# Patient Record
Sex: FEMALE | Race: WHITE | HISPANIC OR LATINO | Employment: PART TIME | URBAN - METROPOLITAN AREA
[De-identification: names, ages, dates, MRNs, and addresses within clinical notes are randomized per-mention and may not be internally consistent; named-entity substitution may affect disease eponyms.]

---

## 2017-06-10 ENCOUNTER — APPOINTMENT (OUTPATIENT)
Dept: RADIOLOGY | Facility: IMAGING CENTER | Age: 32
End: 2017-06-10
Attending: NURSE PRACTITIONER
Payer: COMMERCIAL

## 2017-06-10 ENCOUNTER — OFFICE VISIT (OUTPATIENT)
Dept: URGENT CARE | Facility: CLINIC | Age: 32
End: 2017-06-10
Payer: COMMERCIAL

## 2017-06-10 VITALS
OXYGEN SATURATION: 98 % | TEMPERATURE: 97.2 F | SYSTOLIC BLOOD PRESSURE: 102 MMHG | RESPIRATION RATE: 16 BRPM | HEART RATE: 78 BPM | DIASTOLIC BLOOD PRESSURE: 78 MMHG

## 2017-06-10 DIAGNOSIS — S29.019A STRAIN OF THORACIC SPINE, INITIAL ENCOUNTER: ICD-10-CM

## 2017-06-10 DIAGNOSIS — S20.212A CHEST WALL CONTUSION, LEFT, INITIAL ENCOUNTER: ICD-10-CM

## 2017-06-10 DIAGNOSIS — S46.912A LEFT SHOULDER STRAIN, INITIAL ENCOUNTER: ICD-10-CM

## 2017-06-10 DIAGNOSIS — S16.1XXA STRAIN OF NECK, INITIAL ENCOUNTER: ICD-10-CM

## 2017-06-10 PROCEDURE — 99203 OFFICE O/P NEW LOW 30 MIN: CPT | Performed by: NURSE PRACTITIONER

## 2017-06-10 PROCEDURE — 73030 X-RAY EXAM OF SHOULDER: CPT | Mod: TC | Performed by: NURSE PRACTITIONER

## 2017-06-10 PROCEDURE — 71020 DX-CHEST-2 VIEWS: CPT | Mod: 26 | Performed by: NURSE PRACTITIONER

## 2017-06-10 PROCEDURE — 72040 X-RAY EXAM NECK SPINE 2-3 VW: CPT | Mod: TC | Performed by: NURSE PRACTITIONER

## 2017-06-10 PROCEDURE — 72070 X-RAY EXAM THORAC SPINE 2VWS: CPT | Mod: TC | Performed by: NURSE PRACTITIONER

## 2017-06-10 RX ORDER — CYCLOBENZAPRINE HCL 10 MG
10 TABLET ORAL 3 TIMES DAILY PRN
Qty: 30 TAB | Refills: 0 | Status: ON HOLD | OUTPATIENT
Start: 2017-06-10 | End: 2018-03-14

## 2017-06-10 ASSESSMENT — ENCOUNTER SYMPTOMS
CHILLS: 0
NECK PAIN: 1
LOSS OF CONSCIOUSNESS: 0
NAUSEA: 0
FOCAL WEAKNESS: 0
ABDOMINAL PAIN: 0
VOMITING: 0
BACK PAIN: 1
FEVER: 0
MYALGIAS: 0
SENSORY CHANGE: 0
TINGLING: 0

## 2017-06-10 NOTE — Clinical Note
Alicia 10, 2017         Patient: Mattie Garcia   YOB: 1985   Date of Visit: 6/10/2017           To Whom it May Concern:    Mattie Garcia was seen in my clinic on 6/10/2017. She should be off for 2-3 days due to illness.     If you have any questions or concerns, please don't hesitate to call.        Sincerely,           SARA HendricksP.JAIR.  Electronically Signed
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
## 2018-03-12 ENCOUNTER — HOSPITAL ENCOUNTER (OUTPATIENT)
Facility: MEDICAL CENTER | Age: 33
End: 2018-03-12
Attending: OBSTETRICS & GYNECOLOGY | Admitting: OBSTETRICS & GYNECOLOGY
Payer: COMMERCIAL

## 2018-03-12 VITALS
TEMPERATURE: 97 F | HEART RATE: 93 BPM | SYSTOLIC BLOOD PRESSURE: 107 MMHG | RESPIRATION RATE: 16 BRPM | HEIGHT: 61 IN | BODY MASS INDEX: 36.63 KG/M2 | DIASTOLIC BLOOD PRESSURE: 64 MMHG | WEIGHT: 194 LBS

## 2018-03-12 PROCEDURE — 59025 FETAL NON-STRESS TEST: CPT | Performed by: OBSTETRICS & GYNECOLOGY

## 2018-03-13 ENCOUNTER — HOSPITAL ENCOUNTER (INPATIENT)
Facility: MEDICAL CENTER | Age: 33
LOS: 1 days | End: 2018-03-14
Attending: OBSTETRICS & GYNECOLOGY | Admitting: OBSTETRICS & GYNECOLOGY
Payer: COMMERCIAL

## 2018-03-13 LAB
BASOPHILS # BLD AUTO: 0.3 % (ref 0–1.8)
BASOPHILS # BLD: 0.03 K/UL (ref 0–0.12)
CRYSTALS AMN MICRO: NORMAL
EOSINOPHIL # BLD AUTO: 0.04 K/UL (ref 0–0.51)
EOSINOPHIL NFR BLD: 0.4 % (ref 0–6.9)
ERYTHROCYTE [DISTWIDTH] IN BLOOD BY AUTOMATED COUNT: 39.5 FL (ref 35.9–50)
HCT VFR BLD AUTO: 42.3 % (ref 37–47)
HGB BLD-MCNC: 13.8 G/DL (ref 12–16)
HOLDING TUBE BB 8507: NORMAL
IMM GRANULOCYTES # BLD AUTO: 0.02 K/UL (ref 0–0.11)
IMM GRANULOCYTES NFR BLD AUTO: 0.2 % (ref 0–0.9)
LYMPHOCYTES # BLD AUTO: 2.81 K/UL (ref 1–4.8)
LYMPHOCYTES NFR BLD: 25.5 % (ref 22–41)
MCH RBC QN AUTO: 27.6 PG (ref 27–33)
MCHC RBC AUTO-ENTMCNC: 32.6 G/DL (ref 33.6–35)
MCV RBC AUTO: 84.6 FL (ref 81.4–97.8)
MONOCYTES # BLD AUTO: 0.64 K/UL (ref 0–0.85)
MONOCYTES NFR BLD AUTO: 5.8 % (ref 0–13.4)
NEUTROPHILS # BLD AUTO: 7.46 K/UL (ref 2–7.15)
NEUTROPHILS NFR BLD: 67.8 % (ref 44–72)
NRBC # BLD AUTO: 0 K/UL
NRBC BLD-RTO: 0 /100 WBC
PLATELET # BLD AUTO: 280 K/UL (ref 164–446)
PMV BLD AUTO: 10 FL (ref 9–12.9)
RBC # BLD AUTO: 5 M/UL (ref 4.2–5.4)
WBC # BLD AUTO: 11 K/UL (ref 4.8–10.8)

## 2018-03-13 PROCEDURE — 36415 COLL VENOUS BLD VENIPUNCTURE: CPT

## 2018-03-13 PROCEDURE — 700102 HCHG RX REV CODE 250 W/ 637 OVERRIDE(OP): Performed by: OBSTETRICS & GYNECOLOGY

## 2018-03-13 PROCEDURE — 4A1HXCZ MONITORING OF PRODUCTS OF CONCEPTION, CARDIAC RATE, EXTERNAL APPROACH: ICD-10-PCS | Performed by: OBSTETRICS & GYNECOLOGY

## 2018-03-13 PROCEDURE — 700111 HCHG RX REV CODE 636 W/ 250 OVERRIDE (IP): Performed by: OBSTETRICS & GYNECOLOGY

## 2018-03-13 PROCEDURE — 770002 HCHG ROOM/CARE - OB PRIVATE (112)

## 2018-03-13 PROCEDURE — 304965 HCHG RECOVERY SERVICES

## 2018-03-13 PROCEDURE — 700105 HCHG RX REV CODE 258

## 2018-03-13 PROCEDURE — 85025 COMPLETE CBC W/AUTO DIFF WBC: CPT

## 2018-03-13 PROCEDURE — A9270 NON-COVERED ITEM OR SERVICE: HCPCS | Performed by: OBSTETRICS & GYNECOLOGY

## 2018-03-13 PROCEDURE — 89060 EXAM SYNOVIAL FLUID CRYSTALS: CPT

## 2018-03-13 PROCEDURE — 700111 HCHG RX REV CODE 636 W/ 250 OVERRIDE (IP)

## 2018-03-13 PROCEDURE — 59409 OBSTETRICAL CARE: CPT

## 2018-03-13 RX ORDER — MAG HYDROX/ALUMINUM HYD/SIMETH 400-400-40
1 SUSPENSION, ORAL (FINAL DOSE FORM) ORAL
Status: DISCONTINUED | OUTPATIENT
Start: 2018-03-13 | End: 2018-03-14 | Stop reason: HOSPADM

## 2018-03-13 RX ORDER — SODIUM CHLORIDE, SODIUM LACTATE, POTASSIUM CHLORIDE, CALCIUM CHLORIDE 600; 310; 30; 20 MG/100ML; MG/100ML; MG/100ML; MG/100ML
INJECTION, SOLUTION INTRAVENOUS PRN
Status: DISCONTINUED | OUTPATIENT
Start: 2018-03-13 | End: 2018-03-14 | Stop reason: HOSPADM

## 2018-03-13 RX ORDER — IBUPROFEN 600 MG/1
600 TABLET ORAL EVERY 6 HOURS PRN
Status: DISCONTINUED | OUTPATIENT
Start: 2018-03-13 | End: 2018-03-14 | Stop reason: HOSPADM

## 2018-03-13 RX ORDER — MISOPROSTOL 200 UG/1
800 TABLET ORAL
Status: DISCONTINUED | OUTPATIENT
Start: 2018-03-13 | End: 2018-03-13 | Stop reason: HOSPADM

## 2018-03-13 RX ORDER — CARBOPROST TROMETHAMINE 250 UG/ML
250 INJECTION, SOLUTION INTRAMUSCULAR
Status: DISCONTINUED | OUTPATIENT
Start: 2018-03-13 | End: 2018-03-13 | Stop reason: HOSPADM

## 2018-03-13 RX ORDER — METHYLERGONOVINE MALEATE 0.2 MG/ML
0.2 INJECTION INTRAVENOUS
Status: DISCONTINUED | OUTPATIENT
Start: 2018-03-13 | End: 2018-03-13 | Stop reason: HOSPADM

## 2018-03-13 RX ORDER — OXYTOCIN 10 [USP'U]/ML
10 INJECTION, SOLUTION INTRAMUSCULAR; INTRAVENOUS
Status: DISCONTINUED | OUTPATIENT
Start: 2018-03-13 | End: 2018-03-13 | Stop reason: HOSPADM

## 2018-03-13 RX ORDER — SODIUM CHLORIDE, SODIUM LACTATE, POTASSIUM CHLORIDE, CALCIUM CHLORIDE 600; 310; 30; 20 MG/100ML; MG/100ML; MG/100ML; MG/100ML
INJECTION, SOLUTION INTRAVENOUS CONTINUOUS
Status: DISCONTINUED | OUTPATIENT
Start: 2018-03-13 | End: 2018-03-13 | Stop reason: HOSPADM

## 2018-03-13 RX ORDER — OXYCODONE AND ACETAMINOPHEN 10; 325 MG/1; MG/1
1 TABLET ORAL EVERY 4 HOURS PRN
Status: DISCONTINUED | OUTPATIENT
Start: 2018-03-13 | End: 2018-03-14 | Stop reason: HOSPADM

## 2018-03-13 RX ORDER — ACETAMINOPHEN 325 MG/1
325 TABLET ORAL EVERY 4 HOURS PRN
Status: DISCONTINUED | OUTPATIENT
Start: 2018-03-13 | End: 2018-03-14 | Stop reason: HOSPADM

## 2018-03-13 RX ORDER — VITAMIN A ACETATE, BETA CAROTENE, ASCORBIC ACID, CHOLECALCIFEROL, .ALPHA.-TOCOPHEROL ACETATE, DL-, THIAMINE MONONITRATE, RIBOFLAVIN, NIACINAMIDE, PYRIDOXINE HYDROCHLORIDE, FOLIC ACID, CYANOCOBALAMIN, CALCIUM CARBONATE, FERROUS FUMARATE, ZINC OXIDE, CUPRIC OXIDE 3080; 12; 120; 400; 1; 1.84; 3; 20; 22; 920; 25; 200; 27; 10; 2 [IU]/1; UG/1; MG/1; [IU]/1; MG/1; MG/1; MG/1; MG/1; MG/1; [IU]/1; MG/1; MG/1; MG/1; MG/1; MG/1
1 TABLET, FILM COATED ORAL EVERY MORNING
Status: DISCONTINUED | OUTPATIENT
Start: 2018-03-13 | End: 2018-03-14 | Stop reason: HOSPADM

## 2018-03-13 RX ORDER — ALUMINA, MAGNESIA, AND SIMETHICONE 2400; 2400; 240 MG/30ML; MG/30ML; MG/30ML
30 SUSPENSION ORAL EVERY 6 HOURS PRN
Status: DISCONTINUED | OUTPATIENT
Start: 2018-03-13 | End: 2018-03-13 | Stop reason: HOSPADM

## 2018-03-13 RX ORDER — DEXTROSE, SODIUM CHLORIDE, SODIUM LACTATE, POTASSIUM CHLORIDE, AND CALCIUM CHLORIDE 5; .6; .31; .03; .02 G/100ML; G/100ML; G/100ML; G/100ML; G/100ML
INJECTION, SOLUTION INTRAVENOUS CONTINUOUS
Status: DISCONTINUED | OUTPATIENT
Start: 2018-03-13 | End: 2018-03-13 | Stop reason: HOSPADM

## 2018-03-13 RX ORDER — MISOPROSTOL 200 UG/1
600 TABLET ORAL
Status: DISCONTINUED | OUTPATIENT
Start: 2018-03-13 | End: 2018-03-14 | Stop reason: HOSPADM

## 2018-03-13 RX ORDER — ONDANSETRON 2 MG/ML
4 INJECTION INTRAMUSCULAR; INTRAVENOUS EVERY 6 HOURS PRN
Status: DISCONTINUED | OUTPATIENT
Start: 2018-03-13 | End: 2018-03-14 | Stop reason: HOSPADM

## 2018-03-13 RX ORDER — OXYCODONE HYDROCHLORIDE AND ACETAMINOPHEN 5; 325 MG/1; MG/1
1 TABLET ORAL EVERY 4 HOURS PRN
Status: DISCONTINUED | OUTPATIENT
Start: 2018-03-13 | End: 2018-03-14 | Stop reason: HOSPADM

## 2018-03-13 RX ORDER — METHYLERGONOVINE MALEATE 0.2 MG/ML
0.2 INJECTION INTRAVENOUS
Status: DISCONTINUED | OUTPATIENT
Start: 2018-03-13 | End: 2018-03-14 | Stop reason: HOSPADM

## 2018-03-13 RX ORDER — ONDANSETRON 4 MG/1
4 TABLET, ORALLY DISINTEGRATING ORAL EVERY 6 HOURS PRN
Status: DISCONTINUED | OUTPATIENT
Start: 2018-03-13 | End: 2018-03-14 | Stop reason: HOSPADM

## 2018-03-13 RX ORDER — HYDROXYZINE 50 MG/1
50 TABLET, FILM COATED ORAL EVERY 6 HOURS PRN
Status: DISCONTINUED | OUTPATIENT
Start: 2018-03-13 | End: 2018-03-13 | Stop reason: HOSPADM

## 2018-03-13 RX ORDER — DOCUSATE SODIUM 100 MG/1
100 CAPSULE, LIQUID FILLED ORAL 2 TIMES DAILY PRN
Status: DISCONTINUED | OUTPATIENT
Start: 2018-03-13 | End: 2018-03-14 | Stop reason: HOSPADM

## 2018-03-13 RX ORDER — CARBOPROST TROMETHAMINE 250 UG/ML
250 INJECTION, SOLUTION INTRAMUSCULAR
Status: DISCONTINUED | OUTPATIENT
Start: 2018-03-13 | End: 2018-03-14 | Stop reason: HOSPADM

## 2018-03-13 RX ORDER — BISACODYL 10 MG
10 SUPPOSITORY, RECTAL RECTAL PRN
Status: DISCONTINUED | OUTPATIENT
Start: 2018-03-13 | End: 2018-03-14 | Stop reason: HOSPADM

## 2018-03-13 RX ORDER — SODIUM CHLORIDE, SODIUM LACTATE, POTASSIUM CHLORIDE, CALCIUM CHLORIDE 600; 310; 30; 20 MG/100ML; MG/100ML; MG/100ML; MG/100ML
INJECTION, SOLUTION INTRAVENOUS
Status: COMPLETED
Start: 2018-03-13 | End: 2018-03-13

## 2018-03-13 RX ADMIN — FENTANYL CITRATE 100 MCG: 50 INJECTION, SOLUTION INTRAMUSCULAR; INTRAVENOUS at 07:05

## 2018-03-13 RX ADMIN — OXYCODONE HYDROCHLORIDE AND ACETAMINOPHEN 1 TABLET: 10; 325 TABLET ORAL at 11:33

## 2018-03-13 RX ADMIN — SODIUM CHLORIDE, POTASSIUM CHLORIDE, SODIUM LACTATE AND CALCIUM CHLORIDE 1000 ML: 600; 310; 30; 20 INJECTION, SOLUTION INTRAVENOUS at 06:34

## 2018-03-13 RX ADMIN — Medication 125 ML/HR: at 08:10

## 2018-03-13 RX ADMIN — Medication 2000 ML/HR: at 07:28

## 2018-03-13 RX ADMIN — IBUPROFEN 600 MG: 600 TABLET, FILM COATED ORAL at 08:10

## 2018-03-13 RX ADMIN — SODIUM CHLORIDE, SODIUM LACTATE, POTASSIUM CHLORIDE, CALCIUM CHLORIDE 1000 ML: 600; 310; 30; 20 INJECTION, SOLUTION INTRAVENOUS at 06:34

## 2018-03-13 ASSESSMENT — LIFESTYLE VARIABLES
EVER_SMOKED: NEVER
ALCOHOL_USE: NO

## 2018-03-13 ASSESSMENT — PAIN SCALES - GENERAL: PAINLEVEL_OUTOF10: 7

## 2018-03-13 NOTE — CONSULTS
DATE OF SERVICE:  2018    HISTORY OF PRESENT ILLNESS:  This is a 32-year-old  4, para 3 at 39-40   weeks who came into labor and delivery for evaluation secondary to some   vaginal spotting.  She was seen in the office today and had her membranes   stripped in the office, her exam was 3-4 cm.  She noticed some spotting in the   morning, which stopped in the afternoon and then resumed again this   afternoon.  She is not currently actively bleeding.  She reports occasional   mild contractions.  She denies loss of fluid and reports that the baby has   been moving well.    Prenatal care has been with Dr. Menendez, her JUANITA is 03/15/2018.  She is GBS   negative per patient report.    PAST MEDICAL HISTORY:  None.    PAST SURGICAL HISTORY:  Eye surgery.    MEDICATIONS:  Prenatal vitamins.    ALLERGIES:  NKDA.    GYNECOLOGIC HISTORY:  No history of sexually transmitted diseases or HSV.    OBSTETRICAL HISTORY:  Patient is a  4, para 3.  She has had 3   uncomplicated and unmedicated vaginal deliveries, largest baby weighed 8   pounds.    SOCIAL HISTORY:  Patient is .  She denies tobacco, alcohol or drugs.    She has good transportation to and from the hospital.    PHYSICAL EXAMINATION:  VITAL SIGNS:  Afebrile.  Vital signs are stable.  GENERAL:  She is a well-developed, well-nourished female in no acute distress.  PELVIC:  Sterile vaginal exam per nursing shows that she is 3, 50 and minus 2   to 3 with a scant vaginal spotting noted.  Exam is unchanged over the course   of an hour.  Fetal heart tones show baseline 140s with moderate long-term   variability, accels present, no variables, no decels.  Tocometer shows   irregular contractions up to every 5 minutes apart.    ASSESSMENT AND PLAN:  This is a 32-year-old  4, para 3 at 39 weeks and   4 days and possible latent labor.  1.  Patient's exam shows no significant bleeding and is consistent with prior   membrane sweep today.  Her cervical exam  is basically unchanged from earlier   today and has not changed during the course of her evaluation here in labor   and delivery.  She was given the option to walk and be rechecked to return   home for expectant management as well as to undergo amniotomy and possible   induction with Pitocin this evening.  She has opted to return home.  She   states that she does have adequate transportation to get back if needed.  2.  Fetal surveillance is reassuring for gestational age.       ____________________________________     MD ULISES BOTELLO / BRI    DD:  03/12/2018 20:28:52  DT:  03/12/2018 22:49:19    D#:  [id number]  Job#:  [id number]

## 2018-03-13 NOTE — PROGRESS NOTES
"Pt is a; JUANITA 3/15; 39w4d. Pt c/o VB more than a pad in one hour. Pt reported +FM, -LOF, and occasional cramping. Pt denies needed to void. EFM and TOCO applied. Reactive NST obtained. Pt stating \"I don't think this is labor like my other babies. I was just worried about the bleeding\". SVE at 3/50/-3. Small amount of light pink VB noted upon exam.      Working notified and asked to review tracing.     Dr Working at bedside. POC discussed. Pt desired to go home once cervix is rechecked.     SVE with no cervical change noted. Orders received to discharge pt with labor precautions.     General discharge instructions and labor precautions given. Pt signed paperwork and ambulated out.   "

## 2018-03-13 NOTE — PROGRESS NOTES
0516- [facility] 3/15/2018 36.4 weeks presents to L&D with c/o SROM at 0340 this morning. TOCO/FM applied. SSE=no gross pooling. Fern collected and sent to lab. SVE=/-2 pt is eladio every 3 minutes.  0630-Lab called and Fern is positive. Dr. Bruno called and updated on pt. Orders will be put in for admit. IV started, labs drawn and admit profile completed.  0640- Working at bedside to see pt. SVE=4-  0700-report given to dayshift RN

## 2018-03-13 NOTE — PROGRESS NOTES
Pt arrived via wheel chair with belongings to room S352.  [facility]  Pt oriented to room, call light & infant security.  Assessment done.  Fundus firm, lochia light.  Vital signs stable.  IV patent on pump.  Pt denies complaints of pain, see MAR.  Pt aware of dangers related to sleeping with infant, reviewed plan of care with pt & encouraged to call with needs or prior to ambulating.  Call light in place.

## 2018-03-13 NOTE — PROGRESS NOTES
"0700 - Report from AFSHAN Nair RN. Pt feeling frequent UCs, grimacing with them. POC discussed, questions answered. FOB \"Manuel\" is on the way.   0705 - Pt feeling pressure, requesting exam. /-2  0710 - Dr. Meenndez at bedside.   0720 - FOB at bedside. SVE 8100/-1  0722 - SVE C/0. Dr. Menendez called  0724 -  of viable male infant. 9/9 APGARs  0728 - Delivery of placenta  09 - Pt up to bathroom with steady gait, + void  09 - Pt transferred to [facility] via wheelchair in stable condition with infant in arms, belongings at bedside. Report to ANALIA Babcock  "

## 2018-03-13 NOTE — PROGRESS NOTES
Pt assisted to restroom with this RN, pat provided, ambulated to bed without difficulty.  Call light in place, encouraged to call with needs

## 2018-03-13 NOTE — DELIVERY NOTE
"Delivery Summary    Mattie Alatorre is a 31 yo  who presented at 39wk4d with SROM, in active labor. She progressed to complete dilation and with one pushing effort she underwent a spontaneous vaginal delivery of a viable male infant \"Jesus\" in ANTHONY position with APGARS 9/9. Infant was placed on maternal chest. Cord was clamped and cut after 30 second delay. The placenta was delivered with gentle traction. The uterus firmed with fundal pressure and pitocin. The perineum was examined and no lacerations were detected.     EBL: 300cc  Anesthesia: epidural  Complications: None    Stacey Menendez M.D.      "

## 2018-03-14 VITALS
RESPIRATION RATE: 18 BRPM | HEIGHT: 61 IN | DIASTOLIC BLOOD PRESSURE: 73 MMHG | HEART RATE: 69 BPM | BODY MASS INDEX: 36.63 KG/M2 | TEMPERATURE: 97.2 F | SYSTOLIC BLOOD PRESSURE: 103 MMHG | WEIGHT: 194 LBS | OXYGEN SATURATION: 97 %

## 2018-03-14 LAB
ERYTHROCYTE [DISTWIDTH] IN BLOOD BY AUTOMATED COUNT: 40.1 FL (ref 35.9–50)
HCT VFR BLD AUTO: 35.2 % (ref 37–47)
HGB BLD-MCNC: 11.7 G/DL (ref 12–16)
MCH RBC QN AUTO: 28.5 PG (ref 27–33)
MCHC RBC AUTO-ENTMCNC: 33.2 G/DL (ref 33.6–35)
MCV RBC AUTO: 85.9 FL (ref 81.4–97.8)
PLATELET # BLD AUTO: 229 K/UL (ref 164–446)
PMV BLD AUTO: 10.2 FL (ref 9–12.9)
RBC # BLD AUTO: 4.1 M/UL (ref 4.2–5.4)
WBC # BLD AUTO: 9.2 K/UL (ref 4.8–10.8)

## 2018-03-14 PROCEDURE — A9270 NON-COVERED ITEM OR SERVICE: HCPCS | Performed by: OBSTETRICS & GYNECOLOGY

## 2018-03-14 PROCEDURE — 700112 HCHG RX REV CODE 229: Performed by: OBSTETRICS & GYNECOLOGY

## 2018-03-14 PROCEDURE — 36415 COLL VENOUS BLD VENIPUNCTURE: CPT

## 2018-03-14 PROCEDURE — 700102 HCHG RX REV CODE 250 W/ 637 OVERRIDE(OP): Performed by: OBSTETRICS & GYNECOLOGY

## 2018-03-14 PROCEDURE — 85027 COMPLETE CBC AUTOMATED: CPT

## 2018-03-14 RX ORDER — IBUPROFEN 600 MG/1
600 TABLET ORAL EVERY 6 HOURS PRN
Qty: 30 TAB | Refills: 0 | Status: ON HOLD | OUTPATIENT
Start: 2018-03-14 | End: 2020-01-19

## 2018-03-14 RX ADMIN — DOCUSATE SODIUM 100 MG: 100 CAPSULE ORAL at 02:17

## 2018-03-14 RX ADMIN — IBUPROFEN 600 MG: 600 TABLET, FILM COATED ORAL at 02:17

## 2018-03-14 RX ADMIN — IBUPROFEN 600 MG: 600 TABLET, FILM COATED ORAL at 09:33

## 2018-03-14 RX ADMIN — Medication 1 TABLET: at 09:33

## 2018-03-14 ASSESSMENT — PAIN SCALES - GENERAL
PAINLEVEL_OUTOF10: 0
PAINLEVEL_OUTOF10: 0
PAINLEVEL_OUTOF10: 4
PAINLEVEL_OUTOF10: 5
PAINLEVEL_OUTOF10: 2

## 2018-03-14 NOTE — DISCHARGE INSTRUCTIONS
POSTPARTUM DISCHARGE INSTRUCTIONS FOR MOM    YOB: 1985   Age: 32 y.o.               Admit Date: 3/13/2018     Discharge Date: 3/14/2018  Attending Doctor:  Stacey Menendez M.D.                  Allergies:  Patient has no known allergies.      Discharge Plan:   Diet Plan: Discussed  Activity Level: Discussed  Confirmed Follow up Appointment: Patient to Call and Schedule Appointment  Medication Reconciliation Updated: Yes  Influenza Vaccine Indication: Not indicated: Previously immunized this influenza season and > 8 years of age    REASONS TO CALL YOUR OBSTETRICIAN:  1.   Persistent fever or shaking chills (Temperature higher than 100.4)  2.   Heavy bleeding (soaking more than 1 pad per hour); Passing clots  3.   Foul odor from vagina  4.   Mastitis (Breast infection; breast pain, chills, fever, redness)  5.   Urinary pain, burning or frequency  6.   Episiotomy infection  7.   Abdominal incision infection  8.   Severe depression longer than 24 hours    HAND WASHING  · Prior to handling the baby.  · Before breastfeeding or bottle feeding baby.  · After using the bathroom or changing the baby's diaper.    WOUND CARE  Ask your physician for additional care instructions.  In general:    ·  Incision:      · Keep clean and dry.    · Do NOT lift anything heavier than your baby for up to 6 weeks.    · There should not be any opening or pus.      VAGINAL CARE  · Nothing inside vagina for 6 weeks: no sexual intercourse, tampons or douching.  · Bleeding may continue for 2-4 weeks.  Amount may vary.    · Call your physician for heavy bleeding which means soaking more than 1 pad per hour    BIRTH CONTROL  · It is possible to become pregnant at any time after delivery and while breastfeeding.  · Plan to discuss a method of birth control with your physician at your follow up visit. visit.    DIET AND ELIMINATION  · Eating more fiber (bran cereal, fruits, and vegetables) and drinking plenty of fluids will help  "to avoid constipation.  · Urinary frequency after childbirth is normal.    POSTPARTUM BLUES  During the first few days after birth, you may experience a sense of the \"blues\" which may include impatience, irritability or even crying.  These feeling come and go quickly.  However, as many as 1 in 10 women experience emotional symptoms known as postpartum depression.    Postpartum depression:  May start as early as the second or third day after delivery or take several weeks or months to develop.  Symptoms of \"blues\" are present, but are more intense:  Crying spells; loss of appetite; feelings of hopelessness or loss of control; fear of touching the baby; over concern or no concern at all about the baby; little or no concern about your own appearance/caring for yourself; and/or inability to sleep or excessive sleeping.  Contact your physician if you are experiencing any of these symptoms.    Crisis Hotline:  · [place] Crisis Hotline:  4-360-LHCOVGA  Or 1-686.965.4054  · [place] Crisis Hotline:  1-360.478.3798  Or 603-122-0151    PREVENTING SHAKEN BABY:  If you are angry or stressed, PUT THE BABY IN THE CRIB, step away, take some deep breaths, and wait until you are calm to care for the baby.  DO NOT SHAKE THE BABY.  You are not alone, call a supporter for help.    · Crisis Call Center 24/7 crisis line 999-184-1441 or 1-437.912.8649  · You can also text them, text \"ANSWER\" to 090470    QUIT SMOKING/TOBACCO USE:  I understand the use of any tobacco products increases my chance of suffering from future heart disease and could cause other illnesses which may shorten my life. Quitting the use of tobacco products is the single most important thing I can do to improve my health. For further information on smoking / tobacco cessation call a Toll Free Quit Line at 1-595.835.2076 (*[employer]) or 1-966.493.2724 (American Lung Association) or you can access the web based program at www.lungusa.org.    · [place] " Tobacco Users Help Line:  (503) 828-8020       Toll Free: 1-716.102.4049  · Quit Tobacco Program [facility] (982)942-5331    DEPRESSION / SUICIDE RISK:  As [facility] are [facility], it is important to learn how to keep safe from harming yourself.    Recognize the warning signs:  · Abrupt changes in personality, positive or negative- including increase in energy   · Giving away possessions  · Change in eating patterns- significant weight changes-  positive or negative  · Change in sleeping patterns- unable to sleep or sleeping all the time   · Unwillingness or inability to communicate  · Depression  · Unusual sadness, discouragement and loneliness  · Talk of wanting to die  · Neglect of personal appearance   · Rebelliousness- reckless behavior  · Withdrawal from people/activities they love  · Confusion- inability to concentrate     If you or a loved one observes any of these behaviors or has concerns about self-harm, here's what you can do:  · Talk about it- your feelings and reasons for harming yourself  · Remove any means that you might use to hurt yourself (examples: pills, rope, extension cords, firearm)  · Get professional help from the community (Mental Health, Substance Abuse, psychological counseling)  · Do not be alone:Call your Safe Contact- someone whom you trust who will be there for you.  · Call your local CRISIS HOTLINE 624-0996 or 535-189-4873  · Call your local Children's Mobile Crisis Response Team [place] (149) 135-3895 or www.[employer]  · Call the toll free National Suicide Prevention Hotlines   · National Suicide Prevention Lifeline 590-548-AVNQ (4425)  · National Hope Line Network 800-SUICIDE (365-3637)    DISCHARGE SURVEY:  Thank you for choosing [facility].  We hope we provided you with very good care.  You may be receiving a survey in the mail.  Please fill it out.  Your opinion is valuable to us.    ADDITIONAL EDUCATIONAL  MATERIALS GIVEN TO PATIENT:        My signature on this form indicates that:  1.  I have reviewed and understand the above information  2.  My questions regarding this information have been answered to my satisfaction.  3.  I have formulated a plan with my discharge nurse to obtain my prescribed medication for home.

## 2018-03-14 NOTE — PROGRESS NOTES
Discharge appts, education and information discussed with patient and she understands infants and her own care.  Given Rx.    Transponder removed and ID bands verified.

## 2018-03-14 NOTE — CARE PLAN
Problem: Communication  Goal: The ability to communicate needs accurately and effectively will improve  Outcome: PROGRESSING AS EXPECTED  Patient ambulating well, taking adequate fluids, voiding. Education done, all questions and concerns answered.     Problem: Pain Management  Goal: Pain level will decrease to patient's comfort goal  Outcome: PROGRESSING AS EXPECTED  Patient would like to call for PRN pain Medication when needed.

## 2018-03-14 NOTE — DISCHARGE SUMMARY
"Discharge Summary    Admission Date: 3/13/18    Discharge Date: 3/14/18    Diagnosis:    Active Problems:     (spontaneous vaginal delivery)    Labor and delivery, indication for care    Subjective: Pain controlled. Scant lochia. Breast feeding. Eating and voiding without difficulty.    BP (!) 93/57   Pulse 84   Temp 36.4 °C (97.6 °F)   Resp 16   Ht 1.549 m (5' 1\")   Wt 88 kg (194 lb)   SpO2 93%   BMI 36.66 kg/m²     GEN: NAD  GI:soft, Nt, ND  :fundus firm and below umbilicus  EXT:no edema    Hospital Course: Mattie Alatorre is a 33 yo  who presented at 39w4d with SROM and active labor. She progressed to complete and underwent  of a viable male infant \"Jesus\" with APGARS 9/9. EBL 300cc. No lacerations. She was meeting all post partum goals and stable for discharge home on PPD #1.     Discharge Instructions   1. Diet general  2. Activity pelvic rest for 6 weeks     Mattie Alatorre   Home Medication Instructions MARU:[id number]    Printed on:18 0642   Medication Information                      ibuprofen (MOTRIN) 600 MG Tab  Take 1 Tab by mouth every 6 hours as needed (For cramping after delivery; do not give if patient is receiving ketorolac (Toradol)).                 Follow up: 6 weeks    Complications: none    Stacey Menendez M.D.    "

## 2018-03-14 NOTE — H&P
DATE OF ADMISSION:  2018    HISTORY OF PRESENT ILLNESS:  This is a 32-year-old  4, para 3 at 39-40   weeks, who presents in active labor.  She had been seen earlier tonight and   was 3-4 cm.  She returns at this time with spontaneous rupture of membranes at   approximately 2:45 a.m., which was clear and reports painful contractions.    Prenatal care has been with Dr. Menendez.  Her JUANITA is 03/15/2018.  Prenatal   records are not currently available.  She reports that she is GBS negative.    PAST MEDICAL HISTORY:  None.    PAST SURGICAL HISTORY:  Eye surgery.    MEDICATIONS:  Prenatal vitamins.    ALLERGIES:  NKDA.    GYNECOLOGIC HISTORY:  No history of sexually transmitted diseases or HSV.    OBSTETRICAL HISTORY:  Patient is  4, para 3.  She has had 3   uncomplicated medicated deliveries, largest baby weighed 8 pounds.    SOCIAL HISTORY:  Patient is .  She denies tobacco, alcohol or drugs.    OBJECTIVE:  VITAL SIGNS:  Afebrile.  Vital signs stable.  GENERAL:  She is a well-developed, well-nourished female, in no acute   distress.  ABDOMEN:  Gravid.  PELVIC:  Sterile vaginal exam shows that she is now 5 cm grossly ruptured.    Tocometer shows contractions every 3-4 minutes.  Fetal heart tones category 1.    ASSESSMENT AND PLAN:  A 32-year-old  4, para 3 at 39 _____ in active   labor.  Anticipate normal spontaneous vaginal delivery shortly.       ____________________________________     ANNA CORTEZ, MD [facility] / [facility]    DD:  2018 07:51:01  DT:  2018 08:20:11    D#:  [id number]  Job#:  [id number]

## 2018-03-14 NOTE — CARE PLAN
Problem: Medication  Goal: Compliance with prescribed medication will improve  Outcome: PROGRESSING AS EXPECTED

## 2018-04-26 ENCOUNTER — HOSPITAL ENCOUNTER (OUTPATIENT)
Facility: MEDICAL CENTER | Age: 33
End: 2018-04-26
Attending: OBSTETRICS & GYNECOLOGY | Admitting: OBSTETRICS & GYNECOLOGY
Payer: COMMERCIAL

## 2018-11-19 ENCOUNTER — HOSPITAL ENCOUNTER (OUTPATIENT)
Dept: LAB | Facility: MEDICAL CENTER | Age: 33
End: 2018-11-19
Attending: OBSTETRICS & GYNECOLOGY
Payer: COMMERCIAL

## 2018-11-19 ENCOUNTER — HOSPITAL ENCOUNTER (OUTPATIENT)
Dept: RADIOLOGY | Facility: MEDICAL CENTER | Age: 33
End: 2018-11-19
Attending: OBSTETRICS & GYNECOLOGY
Payer: COMMERCIAL

## 2018-11-19 DIAGNOSIS — R10.2 ADNEXAL TENDERNESS, RIGHT: ICD-10-CM

## 2018-11-19 DIAGNOSIS — R30.0 DYSURIA: ICD-10-CM

## 2018-11-19 PROCEDURE — 87086 URINE CULTURE/COLONY COUNT: CPT

## 2018-11-19 PROCEDURE — 76857 US EXAM PELVIC LIMITED: CPT

## 2018-11-21 LAB
BACTERIA UR CULT: NORMAL
SIGNIFICANT IND 70042: NORMAL
SITE SITE: NORMAL
SOURCE SOURCE: NORMAL

## 2019-02-16 ENCOUNTER — APPOINTMENT (OUTPATIENT)
Dept: RADIOLOGY | Facility: MEDICAL CENTER | Age: 34
End: 2019-02-16
Attending: EMERGENCY MEDICINE
Payer: COMMERCIAL

## 2019-02-16 ENCOUNTER — HOSPITAL ENCOUNTER (EMERGENCY)
Facility: MEDICAL CENTER | Age: 34
End: 2019-02-16
Attending: EMERGENCY MEDICINE
Payer: COMMERCIAL

## 2019-02-16 VITALS
SYSTOLIC BLOOD PRESSURE: 119 MMHG | RESPIRATION RATE: 15 BRPM | BODY MASS INDEX: 33.13 KG/M2 | OXYGEN SATURATION: 97 % | HEIGHT: 62 IN | HEART RATE: 74 BPM | TEMPERATURE: 97.6 F | DIASTOLIC BLOOD PRESSURE: 69 MMHG | WEIGHT: 180 LBS

## 2019-02-16 DIAGNOSIS — R55 SYNCOPE AND COLLAPSE: ICD-10-CM

## 2019-02-16 DIAGNOSIS — R11.2 NON-INTRACTABLE VOMITING WITH NAUSEA, UNSPECIFIED VOMITING TYPE: ICD-10-CM

## 2019-02-16 LAB
ANION GAP SERPL CALC-SCNC: 8 MMOL/L (ref 0–11.9)
APPEARANCE UR: CLEAR
BACTERIA #/AREA URNS HPF: ABNORMAL /HPF
BASOPHILS # BLD AUTO: 0.5 % (ref 0–1.8)
BASOPHILS # BLD: 0.03 K/UL (ref 0–0.12)
BILIRUB UR QL STRIP.AUTO: NEGATIVE
BUN SERPL-MCNC: 9 MG/DL (ref 8–22)
CALCIUM SERPL-MCNC: 8.5 MG/DL (ref 8.5–10.5)
CHLORIDE SERPL-SCNC: 107 MMOL/L (ref 96–112)
CO2 SERPL-SCNC: 25 MMOL/L (ref 20–33)
COLOR UR: YELLOW
CREAT SERPL-MCNC: 0.45 MG/DL (ref 0.5–1.4)
D DIMER PPP IA.FEU-MCNC: <0.4 UG/ML (FEU) (ref 0–0.5)
EKG IMPRESSION: NORMAL
EOSINOPHIL # BLD AUTO: 0.13 K/UL (ref 0–0.51)
EOSINOPHIL NFR BLD: 2.2 % (ref 0–6.9)
EPI CELLS #/AREA URNS HPF: ABNORMAL /HPF
ERYTHROCYTE [DISTWIDTH] IN BLOOD BY AUTOMATED COUNT: 37.7 FL (ref 35.9–50)
GLUCOSE SERPL-MCNC: 116 MG/DL (ref 65–99)
GLUCOSE UR STRIP.AUTO-MCNC: NEGATIVE MG/DL
HCT VFR BLD AUTO: 38.7 % (ref 37–47)
HGB BLD-MCNC: 13.1 G/DL (ref 12–16)
HYALINE CASTS #/AREA URNS LPF: ABNORMAL /LPF
IMM GRANULOCYTES # BLD AUTO: 0.02 K/UL (ref 0–0.11)
IMM GRANULOCYTES NFR BLD AUTO: 0.3 % (ref 0–0.9)
KETONES UR STRIP.AUTO-MCNC: NEGATIVE MG/DL
LEUKOCYTE ESTERASE UR QL STRIP.AUTO: ABNORMAL
LYMPHOCYTES # BLD AUTO: 1.55 K/UL (ref 1–4.8)
LYMPHOCYTES NFR BLD: 26.2 % (ref 22–41)
MCH RBC QN AUTO: 28.9 PG (ref 27–33)
MCHC RBC AUTO-ENTMCNC: 33.9 G/DL (ref 33.6–35)
MCV RBC AUTO: 85.4 FL (ref 81.4–97.8)
MICRO URNS: ABNORMAL
MONOCYTES # BLD AUTO: 0.51 K/UL (ref 0–0.85)
MONOCYTES NFR BLD AUTO: 8.6 % (ref 0–13.4)
NEUTROPHILS # BLD AUTO: 3.68 K/UL (ref 2–7.15)
NEUTROPHILS NFR BLD: 62.2 % (ref 44–72)
NITRITE UR QL STRIP.AUTO: NEGATIVE
NRBC # BLD AUTO: 0 K/UL
NRBC BLD-RTO: 0 /100 WBC
PH UR STRIP.AUTO: 8 [PH]
PLATELET # BLD AUTO: 256 K/UL (ref 164–446)
PMV BLD AUTO: 9.2 FL (ref 9–12.9)
POTASSIUM SERPL-SCNC: 3.9 MMOL/L (ref 3.6–5.5)
PROT UR QL STRIP: NEGATIVE MG/DL
RBC # BLD AUTO: 4.53 M/UL (ref 4.2–5.4)
RBC # URNS HPF: ABNORMAL /HPF
RBC UR QL AUTO: NEGATIVE
SODIUM SERPL-SCNC: 140 MMOL/L (ref 135–145)
SP GR UR STRIP.AUTO: 1.01
TROPONIN I SERPL-MCNC: <0.01 NG/ML (ref 0–0.04)
UROBILINOGEN UR STRIP.AUTO-MCNC: 0.2 MG/DL
WBC # BLD AUTO: 5.9 K/UL (ref 4.8–10.8)
WBC #/AREA URNS HPF: ABNORMAL /HPF

## 2019-02-16 PROCEDURE — 93005 ELECTROCARDIOGRAM TRACING: CPT | Performed by: EMERGENCY MEDICINE

## 2019-02-16 PROCEDURE — 84484 ASSAY OF TROPONIN QUANT: CPT

## 2019-02-16 PROCEDURE — 700105 HCHG RX REV CODE 258: Performed by: EMERGENCY MEDICINE

## 2019-02-16 PROCEDURE — 96374 THER/PROPH/DIAG INJ IV PUSH: CPT

## 2019-02-16 PROCEDURE — 71045 X-RAY EXAM CHEST 1 VIEW: CPT

## 2019-02-16 PROCEDURE — 93005 ELECTROCARDIOGRAM TRACING: CPT

## 2019-02-16 PROCEDURE — 80048 BASIC METABOLIC PNL TOTAL CA: CPT

## 2019-02-16 PROCEDURE — 70450 CT HEAD/BRAIN W/O DYE: CPT

## 2019-02-16 PROCEDURE — 85379 FIBRIN DEGRADATION QUANT: CPT

## 2019-02-16 PROCEDURE — 85025 COMPLETE CBC W/AUTO DIFF WBC: CPT

## 2019-02-16 PROCEDURE — 99285 EMERGENCY DEPT VISIT HI MDM: CPT

## 2019-02-16 PROCEDURE — 36415 COLL VENOUS BLD VENIPUNCTURE: CPT

## 2019-02-16 PROCEDURE — 81001 URINALYSIS AUTO W/SCOPE: CPT

## 2019-02-16 PROCEDURE — 700111 HCHG RX REV CODE 636 W/ 250 OVERRIDE (IP): Performed by: EMERGENCY MEDICINE

## 2019-02-16 RX ORDER — ONDANSETRON 2 MG/ML
4 INJECTION INTRAMUSCULAR; INTRAVENOUS ONCE
Status: COMPLETED | OUTPATIENT
Start: 2019-02-16 | End: 2019-02-16

## 2019-02-16 RX ORDER — SODIUM CHLORIDE 9 MG/ML
1000 INJECTION, SOLUTION INTRAVENOUS ONCE
Status: COMPLETED | OUTPATIENT
Start: 2019-02-16 | End: 2019-02-16

## 2019-02-16 RX ADMIN — ONDANSETRON 4 MG: 2 INJECTION INTRAMUSCULAR; INTRAVENOUS at 01:40

## 2019-02-16 RX ADMIN — SODIUM CHLORIDE 1000 ML: 9 INJECTION, SOLUTION INTRAVENOUS at 01:40

## 2019-02-16 NOTE — ED PROVIDER NOTES
ED Provider Note    Scribed for Jasson Gudino M.D. by Rohini Mcclure. 2/16/2019  12:40 AM    Means of arrival: Walk in  History obtained from: Patient and Patient's   History limited by: None    CHIEF COMPLAINT  Chief Complaint   Patient presents with   • Syncope   • Vomiting       HPI    Mattie Alatorre is a 33 y.o. female presenting for evaluation following an unwitnessed syncopal episode that occurred prior to arrival. Prior to episode, the patient states she stood up from the bed and bent over to reach an item on the ground before losing consciousness. Her  heard a loud thud and found the patient unconscious on the ground at that time. He states she remained unconscious for approximately 1 minute before waking up and having one episode of non bloody, non bilious emesis. The patient was able to ambulate to the car following the incident. She currently endorses mild nausea, generalized weakness, dizziness, and slight heart palpitations. No alleviating or exacerbating factors are identified. The patient has no concerning medical conditions and does not take any daily medications. She reports no significant drug or alcohol use. The patient denies associated acute headache, numbness, abdominal pain, hematochezia, dysuria, or vaginal discharge.     REVIEW OF SYSTEMS  See HPI for further details.   Pertinent positives include: syncope, generalized weakness, dizziness, and heart palpitations.  Pertinent negative include: acute headache, numbness, abdominal pain, hematochezia, dysuria, or vaginal discharge.   10 + review of systems otherwise negative     PAST MEDICAL HISTORY   has a past medical history of Migraines.    SOCIAL HISTORY  Social History     Social History Main Topics   • Smoking status: Never Smoker   • Smokeless tobacco: Never Used   • Alcohol use No   • Drug use: No       SURGICAL HISTORY   has a past surgical history that includes pterygium excision (10/3/2014).    CURRENT  "MEDICATIONS  Home Medications    **Home medications have not yet been reviewed for this encounter**         ALLERGIES  No Known Allergies    PHYSICAL EXAM  VITAL SIGNS: /78   Pulse 75   Temp 36.4 °C (97.6 °F) (Temporal)   Resp 14   Ht 1.575 m (5' 2\")   Wt 81.6 kg (180 lb)   SpO2 100%   BMI 32.92 kg/m²    SpO2: I interpret this pulse oximetry as normal  Constitutional: Well developed, Well nourished, No distress, Non-toxic appearance.   HENT: Normocephalic, Atraumatic, Bilateral external ears normal, Oropharynx moist, No oral exudates. TM's clear bilaterally  Eyes: PERRLA, EOMI, Conjunctiva normal, No discharge.   Neck: No tenderness, Supple, No stridor.   Lymphatic: No lymphadenopathy noted.   Cardiovascular: Normal heart rate, Normal rhythm. 2+ equal radial pulses.   Thorax & Lungs: Clear to auscultation bilaterally, No respiratory distress, No wheezing, No crackles.   Abdomen: Soft, No tenderness, No masses, No pulsatile masses.   Skin: Warm, Dry, No erythema, No rash.   Extremities:, No edema No cyanosis.   Musculoskeletal: No tenderness to palpation or major deformities noted.  Intact distal pulses  Neurologic: Awake, alert. Moves all extremities spontaneously. 5/5 strength in lower extremities .  Psychiatric: Affect normal, Judgment normal, Mood normal.       DIAGNOSTIC STUDIES / PROCEDURES    EKG  Results for orders placed or performed during the hospital encounter of 19   EKG   Result Value Ref Range    Report       [facility] Emergency Dept.    Test Date:  2019  Pt Name:    JEREMÍAS GERMAN                 Department: ER  MRN:        [id number]                      Room:       RD 12  Gender:     Female                       Technician: [id number]  :        1985                   Requested By:ER TRIAGE PROTOCOL  Order #:    [id number]                    Reading MD: Jasson Gudino MD    Measurements  Intervals                                Axis  Rate:       81        "                    P:          43  SC:         188                          QRS:        2  QRSD:       102                          T:          -11  QT:         376  QTc:        437    Interpretive Statements  EKG (my interpretation): Normal sinus rhythm, rate 81, axis normal, T wave  inversions in V3/V4, otherwise no ST or T-wave abnormalities, normal  intervals  and no signs of concerning arrhythmia nonspecific T wave abnormalities, no  priors  for comparison  Relevant clinical issues: Syncope  Electronically  Signed On 2- 3:20:02 PST by Jasson Gudino MD         LABORATORY  Results for orders placed or performed during the hospital encounter of 02/16/19   URINALYSIS CULTURE, IF INDICATED   Result Value Ref Range    Color Yellow     Character Clear     Specific Gravity 1.009 <1.035    Ph 8.0 5.0 - 8.0    Glucose Negative Negative mg/dL    Ketones Negative Negative mg/dL    Protein Negative Negative mg/dL    Bilirubin Negative Negative    Urobilinogen, Urine 0.2 Negative    Nitrite Negative Negative    Leukocyte Esterase Small (A) Negative    Occult Blood Negative Negative    Micro Urine Req Microscopic    TROPONIN   Result Value Ref Range    Troponin I <0.01 0.00 - 0.04 ng/mL   D-DIMER   Result Value Ref Range    D-Dimer Screen <0.40 0.00 - 0.50 ug/mL (FEU)   Basic Metabolic Panel   Result Value Ref Range    Sodium 140 135 - 145 mmol/L    Potassium 3.9 3.6 - 5.5 mmol/L    Chloride 107 96 - 112 mmol/L    Co2 25 20 - 33 mmol/L    Glucose 116 (H) 65 - 99 mg/dL    Bun 9 8 - 22 mg/dL    Creatinine 0.45 (L) 0.50 - 1.40 mg/dL    Calcium 8.5 8.5 - 10.5 mg/dL    Anion Gap 8.0 0.0 - 11.9   CBC WITH DIFFERENTIAL   Result Value Ref Range    WBC 5.9 4.8 - 10.8 K/uL    RBC 4.53 4.20 - 5.40 M/uL    Hemoglobin 13.1 12.0 - 16.0 g/dL    Hematocrit 38.7 37.0 - 47.0 %    MCV 85.4 81.4 - 97.8 fL    MCH 28.9 27.0 - 33.0 pg    MCHC 33.9 33.6 - 35.0 g/dL    RDW 37.7 35.9 - 50.0 fL    Platelet Count 256 164 - 446 K/uL    MPV 9.2  9.0 - 12.9 fL    Neutrophils-Polys 62.20 44.00 - 72.00 %    Lymphocytes 26.20 22.00 - 41.00 %    Monocytes 8.60 0.00 - 13.40 %    Eosinophils 2.20 0.00 - 6.90 %    Basophils 0.50 0.00 - 1.80 %    Immature Granulocytes 0.30 0.00 - 0.90 %    Nucleated RBC 0.00 /100 WBC    Neutrophils (Absolute) 3.68 2.00 - 7.15 K/uL    Lymphs (Absolute) 1.55 1.00 - 4.80 K/uL    Monos (Absolute) 0.51 0.00 - 0.85 K/uL    Eos (Absolute) 0.13 0.00 - 0.51 K/uL    Baso (Absolute) 0.03 0.00 - 0.12 K/uL    Immature Granulocytes (abs) 0.02 0.00 - 0.11 K/uL    NRBC (Absolute) 0.00 K/uL   ESTIMATED GFR   Result Value Ref Range    GFR If African American >60 >60 mL/min/1.73 m 2    GFR If Non African American >60 >60 mL/min/1.73 m 2   EKG   Result Value Ref Range    Report       [facility] [place] Emergency Dept.    Test Date:  2019  Pt Name:    JEREMÍAS GERMAN                 Department: ER  MRN:        [id number]                      Room:       RD 12  Gender:     Female                       Technician: [id number]  :        1985                   Requested By:ER TRIAGE PROTOCOL  Order #:    [id number]                    Reading MD: Jasson Gudino MD    Measurements  Intervals                                Axis  Rate:       81                           P:          43  TX:         188                          QRS:        2  QRSD:       102                          T:          -11  QT:         376  QTc:        437    Interpretive Statements  EKG (my interpretation): Normal sinus rhythm, rate 81, axis normal, T wave  inversions in V3/V4, otherwise no ST or T-wave abnormalities, normal  intervals  and no signs of concerning arrhythmia nonspecific T wave abnormalities, no  priors  for comparison  Relevant clinical issues: Syncope  Electronically  Signed On 2019 3:20:02 PST by Jasson Gudino MD         RADIOLOGY    CT-HEAD W/O   Final Result      No evidence of acute intracranial process.      DX-CHEST-PORTABLE (1 VIEW)    Final Result      No evidence of acute cardiopulmonary process.              Chest XR, 1 view (my read): No focal infiltrates or large effusion.  Normal mediastinum. No prior films were immediately available for comparison.  Impression: Normal chest x-ray    CHART REVIEW  Pertinent medical chart information was reviewed and reveals: No recent pertinent encounters    COURSE & MEDICAL DECISION MAKING  Pertinent Labs & Imaging studies reviewed. (See chart for details)    Differential diagnoses include but not limited to: Vasovagal syncope, orthostatic syncope, dehydration, anemia, arrhythmia, gastroenteritis, malignancy, mass    12:40 AM - Patient seen and examined at bedside. Discussed plan of care, including labs and imaging. Patient agrees to the plan of care. The patient will be resuscitated with 1L NS IV and medicated with Zofran 4 mg. Ordered for CT-Head w/o, DX-Chest (1 view), BMP, CBC with differential, Estimated GFR, D-Dimer, Troponin, CBC with differential, BMP, Urinalysis, POC Urinalysis, POC Urine Pregnancy, and EKG to evaluate her symptoms.     3:09 AM - Patient ambulating at this time without difficulty.     3:14 AM - Patient was reevaluated at bedside. She reports to be feeling better and has stable vital signs. Discussed lab and radiology results with the patient that are reassuring at this time. She was informed she will be discharged home and referred to follow up with PCP tomorrow morning. The patient is instructed to return for chest pain, shortness of breath, or any other concerning symptoms. She is understanding and agreeable to discharge.       Vitals:    02/16/19 0025 02/16/19 0130 02/16/19 0230 02/16/19 0330   BP:  122/67 118/66 119/69   Pulse:  71 72 74   Resp:  15 15 15   Temp:       TempSrc:       SpO2:  99% 94% 97%   Weight: 81.6 kg (180 lb)      Height:           Medications   NS infusion 1,000 mL (0 mL Intravenous Stopped 2/16/19 0240)   ondansetron (ZOFRAN) syringe/vial injection 4 mg  (4 mg Intravenous Given 2/16/19 0140)       HYDRATION: Based on the patient's presentation of Dehydration the patient was given IV fluids. IV Hydration was used because oral hydration was not adequate alone. Upon recheck following hydration, the patient was improved.    33-year-old female with no significant medical history presenting for episode of syncope, very short duration of unconsciousness.  Vomited afterwards.  No presyncopal symptoms.  Felt unwell afterwards and this is improving now.  Patient without any recent symptoms.  Vital signs exam very reassuring, no focal neurologic deficits or other concerning findings.  EKG without any signs of serious arrhythmia.  Lab work is unremarkable.  CT head and chest x-ray without any remarkable findings.  Patient given fluids for possible orthostatic nature and vomiting.  Patient feeling much better walking around department.  No current symptoms.  Very low risk syncope patient.  Possibly vasovagal versus orthostatic syncope as she was awakening for night shift and was going to the bathroom.  Remains asymptomatic and hemodynamically stable.  Safe for outpatient management.  Insert discharge    The patient will return for new or worsening symptoms and is stable at the time of discharge.    DISPOSITION:  Patient will be discharged home against medical advice.     FOLLOW UP:  Your primary care physician            OUTPATIENT MEDICATIONS:  Discharge Medication List as of 2/16/2019  3:25 AM          FINAL IMPRESSION  Visit Diagnoses     ICD-10-CM   1. Syncope and collapse R55   2. Non-intractable vomiting with nausea, unspecified vomiting type R11.2        Rohini NIELSEN (Dalila), am scribing for, and in the presence of, Jasson Gudino M.D..    Electronically signed by: Rohini Atkins), 2/16/2019    Jasson NIELSEN M.D. personally performed the services described in this documentation, as scribed by Rohini Mcclure in my presence, and it is both accurate and  complete.    C.    The note accurately reflects work and decisions made by me.  Jasson Gudino  2/17/2019  12:02 AM

## 2019-02-16 NOTE — ED NOTES
Pt discharged home. Assessment complete. Pt ambulates self. VS stable. Pt verbalized discharge instructions. Prescriptions given pt verbalizes teaching provided

## 2019-02-16 NOTE — ED TRIAGE NOTES
Pt was getting up for work remembers alarm, nothing after that, until  slapped her to wak up.  Was found down by  after hearing loud noise in bath room, pt denies chest pain, sob, n/v at this time.  Vomited at home, no hx to speak of, not on daily meds. Pupils [facility]-

## 2019-06-30 ENCOUNTER — OFFICE VISIT (OUTPATIENT)
Dept: URGENT CARE | Facility: CLINIC | Age: 34
End: 2019-06-30
Payer: COMMERCIAL

## 2019-06-30 ENCOUNTER — APPOINTMENT (OUTPATIENT)
Dept: RADIOLOGY | Facility: IMAGING CENTER | Age: 34
End: 2019-06-30
Attending: NURSE PRACTITIONER
Payer: COMMERCIAL

## 2019-06-30 VITALS
SYSTOLIC BLOOD PRESSURE: 102 MMHG | BODY MASS INDEX: 33.68 KG/M2 | RESPIRATION RATE: 18 BRPM | DIASTOLIC BLOOD PRESSURE: 68 MMHG | WEIGHT: 183 LBS | HEART RATE: 60 BPM | TEMPERATURE: 98.3 F | OXYGEN SATURATION: 96 % | HEIGHT: 62 IN

## 2019-06-30 DIAGNOSIS — R07.89 CHEST WALL PAIN: ICD-10-CM

## 2019-06-30 DIAGNOSIS — J06.9 VIRAL UPPER RESPIRATORY TRACT INFECTION: ICD-10-CM

## 2019-06-30 PROCEDURE — 71046 X-RAY EXAM CHEST 2 VIEWS: CPT | Mod: TC | Performed by: NURSE PRACTITIONER

## 2019-06-30 PROCEDURE — 99214 OFFICE O/P EST MOD 30 MIN: CPT | Performed by: NURSE PRACTITIONER

## 2019-06-30 ASSESSMENT — ENCOUNTER SYMPTOMS
NECK PAIN: 0
NAUSEA: 0
FEVER: 1
DOUBLE VISION: 0
CHILLS: 1
HEADACHES: 0
DIZZINESS: 0
SENSORY CHANGE: 0
SORE THROAT: 0

## 2019-06-30 ASSESSMENT — LIFESTYLE VARIABLES: SUBSTANCE_ABUSE: 0

## 2019-06-30 NOTE — PROGRESS NOTES
"Subjective:      Mattie Alatorre is a 34 y.o. female who presents with Fever (101. and her chest pain on and off when she breathing )    Reviewed past medical, surgical and family history. Reviewed prescription and OTC medications with patient in electronic health record today.     No Known Allergies          HPI This is a new problem.  Mattie is a 33 y/o female with c/o  Chest wall pain, fever ( Tmax 101* yesterday). Associated symptoms include: chest pressure 7/10, intermittent, lasting \" a long time\"  with breathing or palpation. Denies SOB, dizziness, N/V, cough .   Treatment tried: ibuprofen. Mild relief of symptoms. No other aggravating or alleviating factors.       Review of Systems   Constitutional: Positive for chills and fever. Negative for malaise/fatigue.   HENT: Negative for congestion and sore throat.    Eyes: Negative for double vision.   Cardiovascular: Positive for chest pain (chest wall pain ).   Gastrointestinal: Negative for nausea.   Genitourinary: Negative for dysuria and urgency.   Musculoskeletal: Negative for neck pain.   Neurological: Negative for dizziness, sensory change and headaches.   Psychiatric/Behavioral: Negative for substance abuse.          Objective:     /68 (BP Location: Left arm, Patient Position: Sitting, BP Cuff Size: Adult)   Pulse 60   Temp 36.8 °C (98.3 °F) (Temporal)   Resp 18   Ht 1.575 m (5' 2\")   Wt 83 kg (183 lb)   SpO2 96%   BMI 33.47 kg/m²      Physical Exam   Constitutional: She is oriented to person, place, and time. Vital signs are normal. She appears well-developed and well-nourished. She is cooperative.  Non-toxic appearance. She does not appear ill.   HENT:   Head: Normocephalic.   Nose: Nose normal.   Mouth/Throat: Uvula is midline, oropharynx is clear and moist and mucous membranes are normal.   Eyes: Pupils are equal, round, and reactive to light.   Neck: Normal range of motion. Neck supple.   Cardiovascular: Normal rate and regular " rhythm.    Pulmonary/Chest: She exhibits tenderness. She exhibits no mass, no crepitus, no edema and no retraction.       Musculoskeletal: Normal range of motion.   Neurological: She is alert and oriented to person, place, and time.   Skin: Skin is warm. Capillary refill takes less than 2 seconds.   Psychiatric: She has a normal mood and affect. Her behavior is normal. Thought content normal.   Nursing note and vitals reviewed.        EKG Interpretation    Rhythm: normal sinus   Rate: normal  Axis: normal  Ectopy: none  Conduction: normal  ST Segments: no acute change  T Waves: non specific changes  Q Waves: none    Clinical Impression: no acute changes. NSR. Comparison EKG 02-16-19     CXR: no acute cardiopulmonary process by my read, radiology pending.         Assessment/Plan:

## 2019-06-30 NOTE — PROGRESS NOTES
"Subjective:      Mattie Alatorre is a 34 y.o. female who presents with Fever (101. and her chest pain on and off when she breathing )    Reviewed past medical, surgical and family history. Reviewed prescription and OTC medications with patient in electronic health record today.     No Known Allergies          HPI This is a new problem.  Mattie is a 35 y/o female with c/o  Chest wall pain, fever ( Tmax 101* yesterday). Associated symptoms include: chest pressure 7/10, intermittent, lasting \" a long time\"  with breathing or palpation. Denies SOB, dizziness, N/V, cough .  Symptoms are unchanged since onset.   Treatment tried: ibuprofen. Mild relief of symptoms. No other aggravating or alleviating factors.       Review of Systems   Constitutional: Positive for chills and fever. Negative for malaise/fatigue.   HENT: Negative for congestion and sore throat.    Eyes: Negative for double vision.   Cardiovascular: Positive for chest pain (chest wall pain ).   Gastrointestinal: Negative for nausea.   Genitourinary: Negative for dysuria and urgency.   Musculoskeletal: Negative for neck pain.   Neurological: Negative for dizziness, sensory change and headaches.   Psychiatric/Behavioral: Negative for substance abuse.          Objective:     /68 (BP Location: Left arm, Patient Position: Sitting, BP Cuff Size: Adult)   Pulse 60   Temp 36.8 °C (98.3 °F) (Temporal)   Resp 18   Ht 1.575 m (5' 2\")   Wt 83 kg (183 lb)   SpO2 96%   BMI 33.47 kg/m²      Physical Exam   Constitutional: She is oriented to person, place, and time. Vital signs are normal. She appears well-developed and well-nourished. She is cooperative.  Non-toxic appearance. She does not appear ill.   HENT:   Head: Normocephalic.   Nose: Nose normal.   Mouth/Throat: Uvula is midline, oropharynx is clear and moist and mucous membranes are normal.   Eyes: Pupils are equal, round, and reactive to light.   Neck: Normal range of motion. Neck supple.   "   Cardiovascular: Normal rate and regular rhythm.    Pulmonary/Chest: She exhibits tenderness. She exhibits no mass, no crepitus, no edema and no retraction.       Musculoskeletal: Normal range of motion.   Neurological: She is alert and oriented to person, place, and time.   Skin: Skin is warm. Capillary refill takes less than 2 seconds.   Psychiatric: She has a normal mood and affect. Her behavior is normal. Thought content normal.   Nursing note and vitals reviewed.        EKG Interpretation    Rhythm: normal sinus   Rate: normal  Axis: normal  Ectopy: none  Conduction: normal  ST Segments: no acute change  T Waves: non specific changes  Q Waves: none    Clinical Impression: no acute changes. NSR. Comparison EKG 02-16-19     CXR: no acute cardiopulmonary process by my read, radiology pending.    6/30/2019 1:37 PM    HISTORY/REASON FOR EXAM:  Chest Pain      TECHNIQUE/EXAM DESCRIPTION AND NUMBER OF VIEWS:  Two views of the chest.    COMPARISON:  2/16/2019    FINDINGS:  Cardiomediastinal contour is within normal limits.  No focal pulmonary consolidation.  No pleural fluid collection or pneumothorax.  No major bony abnormality is seen.   Impression       No acute cardiopulmonary disease.   Reading Provider Reading Date   Adrian Quiroz M.D.           Assessment/Plan:     1. Chest wall pain  EKG - Clinic Performed    DX-CHEST-2 VIEWS   2. Viral upper respiratory tract infection       Differential Diagnosis includes but is not limited to:  URI, costochondritis or other.       OTC  analgesic of choice. Follow manufactures dosing and safety precautions.     Return to clinic or PCP 4-5  days if current symptoms are not resolving in a satisfactory manner or sooner if new or worsening symptoms occur.   Patient was advised of signs and symptoms which would warrant further evaluation and /or emergent evaluation in ER.  Verbalized agreement with this treatment plan and seemed to understand without barriers. Questions were  encouraged and answered to patients satisfaction.     Aftercare instructions were given to pt    Ice/ heat packs prn pain .

## 2019-06-30 NOTE — PATIENT INSTRUCTIONS
"Upper Respiratory Infection, Adult  Most upper respiratory infections (URIs) are a viral infection of the air passages leading to the lungs. A URI affects the nose, throat, and upper air passages. The most common type of URI is nasopharyngitis and is typically referred to as \"the common cold.\"  URIs run their course and usually go away on their own. Most of the time, a URI does not require medical attention, but sometimes a bacterial infection in the upper airways can follow a viral infection. This is called a secondary infection. Sinus and middle ear infections are common types of secondary upper respiratory infections.  Bacterial pneumonia can also complicate a URI. A URI can worsen asthma and chronic obstructive pulmonary disease (COPD). Sometimes, these complications can require emergency medical care and may be life threatening.  What are the causes?  Almost all URIs are caused by viruses. A virus is a type of germ and can spread from one person to another.  What increases the risk?  You may be at risk for a URI if:  · You smoke.  · You have chronic heart or lung disease.  · You have a weakened defense (immune) system.  · You are very young or very old.  · You have nasal allergies or asthma.  · You work in crowded or poorly ventilated areas.  · You work in health care facilities or schools.  What are the signs or symptoms?  Symptoms typically develop 2-3 days after you come in contact with a cold virus. Most viral URIs last 7-10 days. However, viral URIs from the influenza virus (flu virus) can last 14-18 days and are typically more severe. Symptoms may include:  · Runny or stuffy (congested) nose.  · Sneezing.  · Cough.  · Sore throat.  · Headache.  · Fatigue.  · Fever.  · Loss of appetite.  · Pain in your forehead, behind your eyes, and over your cheekbones (sinus pain).  · Muscle aches.  How is this diagnosed?  Your health care provider may diagnose a URI by:  · Physical exam.  · Tests to check that your " symptoms are not due to another condition such as:  ¨ Strep throat.  ¨ Sinusitis.  ¨ Pneumonia.  ¨ Asthma.  How is this treated?  A URI goes away on its own with time. It cannot be cured with medicines, but medicines may be prescribed or recommended to relieve symptoms. Medicines may help:  · Reduce your fever.  · Reduce your cough.  · Relieve nasal congestion.  Follow these instructions at home:  · Take medicines only as directed by your health care provider.  · Gargle warm saltwater or take cough drops to comfort your throat as directed by your health care provider.  · Use a warm mist humidifier or inhale steam from a shower to increase air moisture. This may make it easier to breathe.  · Drink enough fluid to keep your urine clear or pale yellow.  · Eat soups and other clear broths and maintain good nutrition.  · Rest as needed.  · Return to work when your temperature has returned to normal or as your health care provider advises. You may need to stay home longer to avoid infecting others. You can also use a face mask and careful hand washing to prevent spread of the virus.  · Increase the usage of your inhaler if you have asthma.  · Do not use any tobacco products, including cigarettes, chewing tobacco, or electronic cigarettes. If you need help quitting, ask your health care provider.  How is this prevented?  The best way to protect yourself from getting a cold is to practice good hygiene.  · Avoid oral or hand contact with people with cold symptoms.  · Wash your hands often if contact occurs.  There is no clear evidence that vitamin C, vitamin E, echinacea, or exercise reduces the chance of developing a cold. However, it is always recommended to get plenty of rest, exercise, and practice good nutrition.  Contact a health care provider if:  · You are getting worse rather than better.  · Your symptoms are not controlled by medicine.  · You have chills.  · You have worsening shortness of breath.  · You have brown  or red mucus.  · You have yellow or brown nasal discharge.  · You have pain in your face, especially when you bend forward.  · You have a fever.  · You have swollen neck glands.  · You have pain while swallowing.  · You have white areas in the back of your throat.  Get help right away if:  · You have severe or persistent:  ¨ Headache.  ¨ Ear pain.  ¨ Sinus pain.  ¨ Chest pain.  · You have chronic lung disease and any of the following:  ¨ Wheezing.  ¨ Prolonged cough.  ¨ Coughing up blood.  ¨ A change in your usual mucus.  · You have a stiff neck.  · You have changes in your:  ¨ Vision.  ¨ Hearing.  ¨ Thinking.  ¨ Mood.  This information is not intended to replace advice given to you by your health care provider. Make sure you discuss any questions you have with your health care provider.  Document Released: 06/13/2002 Document Revised: 08/20/2017 Document Reviewed: 03/25/2015  [employer] Interactive Patient Education © 2017 [employer]        Chest Wall Pain  Chest wall pain is pain in or around the bones and muscles of your chest. Sometimes, an injury causes this pain. Sometimes, the cause may not be known. This pain may take several weeks or longer to get better.  Follow these instructions at home:  Pay attention to any changes in your symptoms. Take these actions to help with your pain:  · Rest as told by your health care provider.  · Avoid activities that cause pain. These include any activities that use your chest muscles or your abdominal and side muscles to lift heavy items.  · If directed, apply ice to the painful area:  ¨ Put ice in a plastic bag.  ¨ Place a towel between your skin and the bag.  ¨ Leave the ice on for 20 minutes, 2-3 times per day.  · Take over-the-counter and prescription medicines only as told by your health care provider.  · Do not use tobacco products, including cigarettes, chewing tobacco, and e-cigarettes. If you need help quitting, ask your health care provider.  · Keep all follow-up  visits as told by your health care provider. This is important.  Contact a health care provider if:  · You have a fever.  · Your chest pain becomes worse.  · You have new symptoms.  Get help right away if:  · You have nausea or vomiting.  · You feel sweaty or light-headed.  · You have a cough with phlegm (sputum) or you cough up blood.  · You develop shortness of breath.  This information is not intended to replace advice given to you by your health care provider. Make sure you discuss any questions you have with your health care provider.  Document Released: 12/18/2006 Document Revised: 04/27/2017 Document Reviewed: 03/14/2016  [employer] Interactive Patient Education © 2017 [employer]

## 2020-01-18 ENCOUNTER — APPOINTMENT (OUTPATIENT)
Dept: RADIOLOGY | Facility: MEDICAL CENTER | Age: 35
DRG: 419 | End: 2020-01-18
Attending: EMERGENCY MEDICINE
Payer: COMMERCIAL

## 2020-01-18 ENCOUNTER — HOSPITAL ENCOUNTER (INPATIENT)
Facility: MEDICAL CENTER | Age: 35
LOS: 2 days | DRG: 419 | End: 2020-01-22
Attending: EMERGENCY MEDICINE | Admitting: HOSPITALIST
Payer: COMMERCIAL

## 2020-01-18 DIAGNOSIS — R10.9 ACUTE ABDOMINAL PAIN: ICD-10-CM

## 2020-01-18 DIAGNOSIS — E86.0 DEHYDRATION: ICD-10-CM

## 2020-01-18 DIAGNOSIS — R11.0 NAUSEA: ICD-10-CM

## 2020-01-18 DIAGNOSIS — K80.50 CHOLEDOCHOLITHIASIS: ICD-10-CM

## 2020-01-18 DIAGNOSIS — R79.89 ABNORMAL LFTS: ICD-10-CM

## 2020-01-18 DIAGNOSIS — R10.13 EPIGASTRIC ABDOMINAL PAIN: ICD-10-CM

## 2020-01-18 DIAGNOSIS — K80.20 SYMPTOMATIC CHOLELITHIASIS: ICD-10-CM

## 2020-01-18 LAB
ALBUMIN SERPL BCP-MCNC: 4.3 G/DL (ref 3.2–4.9)
ALBUMIN/GLOB SERPL: 1.6 G/DL
ALP SERPL-CCNC: 141 U/L (ref 30–99)
ALT SERPL-CCNC: 306 U/L (ref 2–50)
ANION GAP SERPL CALC-SCNC: 9 MMOL/L (ref 0–11.9)
AST SERPL-CCNC: 301 U/L (ref 12–45)
BASOPHILS # BLD AUTO: 0.3 % (ref 0–1.8)
BASOPHILS # BLD: 0.02 K/UL (ref 0–0.12)
BILIRUB SERPL-MCNC: 2 MG/DL (ref 0.1–1.5)
BUN SERPL-MCNC: 8 MG/DL (ref 8–22)
CALCIUM SERPL-MCNC: 9 MG/DL (ref 8.5–10.5)
CHLORIDE SERPL-SCNC: 105 MMOL/L (ref 96–112)
CO2 SERPL-SCNC: 23 MMOL/L (ref 20–33)
CREAT SERPL-MCNC: 0.58 MG/DL (ref 0.5–1.4)
EOSINOPHIL # BLD AUTO: 0.03 K/UL (ref 0–0.51)
EOSINOPHIL NFR BLD: 0.4 % (ref 0–6.9)
ERYTHROCYTE [DISTWIDTH] IN BLOOD BY AUTOMATED COUNT: 38.1 FL (ref 35.9–50)
GLOBULIN SER CALC-MCNC: 2.7 G/DL (ref 1.9–3.5)
GLUCOSE SERPL-MCNC: 101 MG/DL (ref 65–99)
HCG SERPL QL: NEGATIVE
HCT VFR BLD AUTO: 42.9 % (ref 37–47)
HGB BLD-MCNC: 14.8 G/DL (ref 12–16)
IMM GRANULOCYTES # BLD AUTO: 0.01 K/UL (ref 0–0.11)
IMM GRANULOCYTES NFR BLD AUTO: 0.1 % (ref 0–0.9)
LIPASE SERPL-CCNC: 35 U/L (ref 11–82)
LYMPHOCYTES # BLD AUTO: 2.12 K/UL (ref 1–4.8)
LYMPHOCYTES NFR BLD: 27.8 % (ref 22–41)
MCH RBC QN AUTO: 29.7 PG (ref 27–33)
MCHC RBC AUTO-ENTMCNC: 34.5 G/DL (ref 33.6–35)
MCV RBC AUTO: 86 FL (ref 81.4–97.8)
MONOCYTES # BLD AUTO: 0.45 K/UL (ref 0–0.85)
MONOCYTES NFR BLD AUTO: 5.9 % (ref 0–13.4)
NEUTROPHILS # BLD AUTO: 5 K/UL (ref 2–7.15)
NEUTROPHILS NFR BLD: 65.5 % (ref 44–72)
NRBC # BLD AUTO: 0 K/UL
NRBC BLD-RTO: 0 /100 WBC
PLATELET # BLD AUTO: 290 K/UL (ref 164–446)
PMV BLD AUTO: 9.5 FL (ref 9–12.9)
POTASSIUM SERPL-SCNC: 3.5 MMOL/L (ref 3.6–5.5)
PROT SERPL-MCNC: 7 G/DL (ref 6–8.2)
RBC # BLD AUTO: 4.99 M/UL (ref 4.2–5.4)
SODIUM SERPL-SCNC: 137 MMOL/L (ref 135–145)
TROPONIN T SERPL-MCNC: <6 NG/L (ref 6–19)
WBC # BLD AUTO: 7.6 K/UL (ref 4.8–10.8)

## 2020-01-18 PROCEDURE — 96365 THER/PROPH/DIAG IV INF INIT: CPT

## 2020-01-18 PROCEDURE — 99285 EMERGENCY DEPT VISIT HI MDM: CPT

## 2020-01-18 PROCEDURE — 80053 COMPREHEN METABOLIC PANEL: CPT

## 2020-01-18 PROCEDURE — 93005 ELECTROCARDIOGRAM TRACING: CPT | Performed by: EMERGENCY MEDICINE

## 2020-01-18 PROCEDURE — 85025 COMPLETE CBC W/AUTO DIFF WBC: CPT

## 2020-01-18 PROCEDURE — 700111 HCHG RX REV CODE 636 W/ 250 OVERRIDE (IP): Performed by: EMERGENCY MEDICINE

## 2020-01-18 PROCEDURE — 83690 ASSAY OF LIPASE: CPT

## 2020-01-18 PROCEDURE — 96375 TX/PRO/DX INJ NEW DRUG ADDON: CPT

## 2020-01-18 PROCEDURE — 71045 X-RAY EXAM CHEST 1 VIEW: CPT

## 2020-01-18 PROCEDURE — 84703 CHORIONIC GONADOTROPIN ASSAY: CPT

## 2020-01-18 PROCEDURE — 93005 ELECTROCARDIOGRAM TRACING: CPT

## 2020-01-18 PROCEDURE — 94760 N-INVAS EAR/PLS OXIMETRY 1: CPT

## 2020-01-18 PROCEDURE — 76705 ECHO EXAM OF ABDOMEN: CPT

## 2020-01-18 PROCEDURE — 700105 HCHG RX REV CODE 258: Performed by: EMERGENCY MEDICINE

## 2020-01-18 PROCEDURE — 84484 ASSAY OF TROPONIN QUANT: CPT

## 2020-01-18 RX ORDER — ONDANSETRON 2 MG/ML
4 INJECTION INTRAMUSCULAR; INTRAVENOUS ONCE
Status: COMPLETED | OUTPATIENT
Start: 2020-01-18 | End: 2020-01-18

## 2020-01-18 RX ORDER — MORPHINE SULFATE 4 MG/ML
4 INJECTION, SOLUTION INTRAMUSCULAR; INTRAVENOUS ONCE
Status: COMPLETED | OUTPATIENT
Start: 2020-01-18 | End: 2020-01-18

## 2020-01-18 RX ORDER — SODIUM CHLORIDE, SODIUM LACTATE, POTASSIUM CHLORIDE, CALCIUM CHLORIDE 600; 310; 30; 20 MG/100ML; MG/100ML; MG/100ML; MG/100ML
1000 INJECTION, SOLUTION INTRAVENOUS ONCE
Status: COMPLETED | OUTPATIENT
Start: 2020-01-18 | End: 2020-01-18

## 2020-01-18 RX ADMIN — MORPHINE SULFATE 4 MG: 4 INJECTION INTRAVENOUS at 23:00

## 2020-01-18 RX ADMIN — FAMOTIDINE 20 MG: 10 INJECTION INTRAVENOUS at 23:00

## 2020-01-18 RX ADMIN — SODIUM CHLORIDE, POTASSIUM CHLORIDE, SODIUM LACTATE AND CALCIUM CHLORIDE 1000 ML: 600; 310; 30; 20 INJECTION, SOLUTION INTRAVENOUS at 22:45

## 2020-01-18 RX ADMIN — ONDANSETRON 4 MG: 2 INJECTION INTRAMUSCULAR; INTRAVENOUS at 23:00

## 2020-01-19 ENCOUNTER — APPOINTMENT (OUTPATIENT)
Dept: RADIOLOGY | Facility: MEDICAL CENTER | Age: 35
DRG: 419 | End: 2020-01-19
Attending: HOSPITALIST
Payer: COMMERCIAL

## 2020-01-19 PROBLEM — R10.13 EPIGASTRIC ABDOMINAL PAIN: Status: ACTIVE | Noted: 2020-01-19

## 2020-01-19 PROBLEM — R79.89 ABNORMAL LFTS: Status: ACTIVE | Noted: 2020-01-19

## 2020-01-19 PROBLEM — E87.6 HYPOKALEMIA: Status: ACTIVE | Noted: 2020-01-19

## 2020-01-19 PROBLEM — E66.9 OBESITY: Status: ACTIVE | Noted: 2020-01-19

## 2020-01-19 LAB
APAP SERPL-MCNC: <10 UG/ML (ref 10–30)
EKG IMPRESSION: NORMAL
HAV IGM SERPL QL IA: NEGATIVE
HBV CORE IGM SER QL: NEGATIVE
HBV SURFACE AG SER QL: NEGATIVE
HCV AB SER QL: NEGATIVE
INR PPP: 1.26 (ref 0.87–1.13)
PROTHROMBIN TIME: 16.1 SEC (ref 12–14.6)

## 2020-01-19 PROCEDURE — G0378 HOSPITAL OBSERVATION PER HR: HCPCS

## 2020-01-19 PROCEDURE — 99220 PR INITIAL OBSERVATION CARE,LEVL III: CPT | Performed by: HOSPITALIST

## 2020-01-19 PROCEDURE — 96367 TX/PROPH/DG ADDL SEQ IV INF: CPT

## 2020-01-19 PROCEDURE — 96376 TX/PRO/DX INJ SAME DRUG ADON: CPT

## 2020-01-19 PROCEDURE — 700111 HCHG RX REV CODE 636 W/ 250 OVERRIDE (IP): Performed by: HOSPITALIST

## 2020-01-19 PROCEDURE — 700105 HCHG RX REV CODE 258: Performed by: EMERGENCY MEDICINE

## 2020-01-19 PROCEDURE — 700102 HCHG RX REV CODE 250 W/ 637 OVERRIDE(OP): Performed by: HOSPITALIST

## 2020-01-19 PROCEDURE — 700105 HCHG RX REV CODE 258: Performed by: HOSPITALIST

## 2020-01-19 PROCEDURE — 700111 HCHG RX REV CODE 636 W/ 250 OVERRIDE (IP): Performed by: EMERGENCY MEDICINE

## 2020-01-19 PROCEDURE — 80074 ACUTE HEPATITIS PANEL: CPT

## 2020-01-19 PROCEDURE — 36415 COLL VENOUS BLD VENIPUNCTURE: CPT

## 2020-01-19 PROCEDURE — 700101 HCHG RX REV CODE 250: Performed by: EMERGENCY MEDICINE

## 2020-01-19 PROCEDURE — 74181 MRI ABDOMEN W/O CONTRAST: CPT

## 2020-01-19 PROCEDURE — A9270 NON-COVERED ITEM OR SERVICE: HCPCS | Performed by: HOSPITALIST

## 2020-01-19 PROCEDURE — 85610 PROTHROMBIN TIME: CPT

## 2020-01-19 PROCEDURE — 96375 TX/PRO/DX INJ NEW DRUG ADDON: CPT

## 2020-01-19 PROCEDURE — 80307 DRUG TEST PRSMV CHEM ANLYZR: CPT

## 2020-01-19 RX ORDER — HYDROMORPHONE HYDROCHLORIDE 1 MG/ML
0.5 INJECTION, SOLUTION INTRAMUSCULAR; INTRAVENOUS; SUBCUTANEOUS
Status: DISCONTINUED | OUTPATIENT
Start: 2020-01-19 | End: 2020-01-22 | Stop reason: HOSPADM

## 2020-01-19 RX ORDER — ONDANSETRON 2 MG/ML
4 INJECTION INTRAMUSCULAR; INTRAVENOUS EVERY 4 HOURS PRN
Status: DISCONTINUED | OUTPATIENT
Start: 2020-01-19 | End: 2020-01-22 | Stop reason: HOSPADM

## 2020-01-19 RX ORDER — PROMETHAZINE HYDROCHLORIDE 25 MG/1
12.5-25 TABLET ORAL EVERY 4 HOURS PRN
Status: DISCONTINUED | OUTPATIENT
Start: 2020-01-19 | End: 2020-01-22 | Stop reason: HOSPADM

## 2020-01-19 RX ORDER — BISACODYL 10 MG
10 SUPPOSITORY, RECTAL RECTAL
Status: DISCONTINUED | OUTPATIENT
Start: 2020-01-19 | End: 2020-01-22 | Stop reason: HOSPADM

## 2020-01-19 RX ORDER — SODIUM CHLORIDE 9 MG/ML
INJECTION, SOLUTION INTRAVENOUS CONTINUOUS
Status: DISCONTINUED | OUTPATIENT
Start: 2020-01-19 | End: 2020-01-22 | Stop reason: HOSPADM

## 2020-01-19 RX ORDER — PROMETHAZINE HYDROCHLORIDE 25 MG/1
12.5-25 SUPPOSITORY RECTAL EVERY 4 HOURS PRN
Status: DISCONTINUED | OUTPATIENT
Start: 2020-01-19 | End: 2020-01-22 | Stop reason: HOSPADM

## 2020-01-19 RX ORDER — PROCHLORPERAZINE EDISYLATE 5 MG/ML
5-10 INJECTION INTRAMUSCULAR; INTRAVENOUS EVERY 4 HOURS PRN
Status: DISCONTINUED | OUTPATIENT
Start: 2020-01-19 | End: 2020-01-22 | Stop reason: HOSPADM

## 2020-01-19 RX ORDER — OXYCODONE HYDROCHLORIDE 5 MG/1
5 TABLET ORAL
Status: DISCONTINUED | OUTPATIENT
Start: 2020-01-19 | End: 2020-01-22 | Stop reason: HOSPADM

## 2020-01-19 RX ORDER — AMOXICILLIN 250 MG
2 CAPSULE ORAL 2 TIMES DAILY
Status: DISCONTINUED | OUTPATIENT
Start: 2020-01-19 | End: 2020-01-22 | Stop reason: HOSPADM

## 2020-01-19 RX ORDER — OXYCODONE HYDROCHLORIDE 10 MG/1
10 TABLET ORAL
Status: DISCONTINUED | OUTPATIENT
Start: 2020-01-19 | End: 2020-01-22 | Stop reason: HOSPADM

## 2020-01-19 RX ORDER — POLYETHYLENE GLYCOL 3350 17 G/17G
1 POWDER, FOR SOLUTION ORAL
Status: DISCONTINUED | OUTPATIENT
Start: 2020-01-19 | End: 2020-01-22 | Stop reason: HOSPADM

## 2020-01-19 RX ORDER — ONDANSETRON 4 MG/1
4 TABLET, ORALLY DISINTEGRATING ORAL EVERY 4 HOURS PRN
Status: DISCONTINUED | OUTPATIENT
Start: 2020-01-19 | End: 2020-01-22 | Stop reason: HOSPADM

## 2020-01-19 RX ORDER — POTASSIUM CHLORIDE 20 MEQ/1
40 TABLET, EXTENDED RELEASE ORAL ONCE
Status: COMPLETED | OUTPATIENT
Start: 2020-01-19 | End: 2020-01-19

## 2020-01-19 RX ADMIN — CEFTRIAXONE SODIUM 2 G: 2 INJECTION, POWDER, FOR SOLUTION INTRAMUSCULAR; INTRAVENOUS at 01:20

## 2020-01-19 RX ADMIN — ONDANSETRON 4 MG: 2 INJECTION INTRAMUSCULAR; INTRAVENOUS at 01:47

## 2020-01-19 RX ADMIN — METRONIDAZOLE 500 MG: 500 INJECTION, SOLUTION INTRAVENOUS at 02:15

## 2020-01-19 RX ADMIN — SODIUM CHLORIDE: 9 INJECTION, SOLUTION INTRAVENOUS at 12:36

## 2020-01-19 RX ADMIN — POTASSIUM CHLORIDE 40 MEQ: 1500 TABLET, EXTENDED RELEASE ORAL at 05:03

## 2020-01-19 RX ADMIN — SENNOSIDES AND DOCUSATE SODIUM 2 TABLET: 8.6; 5 TABLET ORAL at 18:11

## 2020-01-19 RX ADMIN — OXYCODONE HYDROCHLORIDE 5 MG: 5 TABLET ORAL at 21:47

## 2020-01-19 RX ADMIN — PROCHLORPERAZINE EDISYLATE 10 MG: 5 INJECTION INTRAMUSCULAR; INTRAVENOUS at 02:03

## 2020-01-19 RX ADMIN — SENNOSIDES AND DOCUSATE SODIUM 2 TABLET: 8.6; 5 TABLET ORAL at 05:03

## 2020-01-19 RX ADMIN — SODIUM CHLORIDE: 9 INJECTION, SOLUTION INTRAVENOUS at 02:03

## 2020-01-19 ASSESSMENT — ENCOUNTER SYMPTOMS
HALLUCINATIONS: 0
BLURRED VISION: 0
DOUBLE VISION: 0
WEAKNESS: 0
CHILLS: 0
HEMOPTYSIS: 0
PALPITATIONS: 0
BRUISES/BLEEDS EASILY: 0
HEARTBURN: 0
MYALGIAS: 0
DIZZINESS: 0
COUGH: 0
SHORTNESS OF BREATH: 0
FLANK PAIN: 0
FOCAL WEAKNESS: 0
EYE DISCHARGE: 0
DEPRESSION: 0
NAUSEA: 1
ABDOMINAL PAIN: 1
SENSORY CHANGE: 0
FEVER: 0
VOMITING: 0
SPEECH CHANGE: 0

## 2020-01-19 ASSESSMENT — LIFESTYLE VARIABLES
TOTAL SCORE: 0
DO YOU DRINK ALCOHOL: NO
EVER_SMOKED: NEVER
SUBSTANCE_ABUSE: 0
DOES PATIENT WANT TO STOP DRINKING: NO
HAVE YOU EVER FELT YOU SHOULD CUT DOWN ON YOUR DRINKING: NO
HOW MANY TIMES IN THE PAST YEAR HAVE YOU HAD 5 OR MORE DRINKS IN A DAY: 0
EVER FELT BAD OR GUILTY ABOUT YOUR DRINKING: NO
EVER HAD A DRINK FIRST THING IN THE MORNING TO STEADY YOUR NERVES TO GET RID OF A HANGOVER: NO
ON A TYPICAL DAY WHEN YOU DRINK ALCOHOL HOW MANY DRINKS DO YOU HAVE: 0
TOTAL SCORE: 0
AVERAGE NUMBER OF DAYS PER WEEK YOU HAVE A DRINK CONTAINING ALCOHOL: 0
DOES PATIENT WANT TO STOP DRINKING: NO
HAVE PEOPLE ANNOYED YOU BY CRITICIZING YOUR DRINKING: NO
CONSUMPTION TOTAL: NEGATIVE
TOTAL SCORE: 0
ALCOHOL_USE: NO

## 2020-01-19 ASSESSMENT — COGNITIVE AND FUNCTIONAL STATUS - GENERAL
SUGGESTED CMS G CODE MODIFIER DAILY ACTIVITY: CH
DAILY ACTIVITIY SCORE: 24
SUGGESTED CMS G CODE MODIFIER MOBILITY: CH
MOBILITY SCORE: 24

## 2020-01-19 ASSESSMENT — PATIENT HEALTH QUESTIONNAIRE - PHQ9
1. LITTLE INTEREST OR PLEASURE IN DOING THINGS: NOT AT ALL
SUM OF ALL RESPONSES TO PHQ9 QUESTIONS 1 AND 2: 0
2. FEELING DOWN, DEPRESSED, IRRITABLE, OR HOPELESS: NOT AT ALL

## 2020-01-19 NOTE — ED NOTES
Pt sitting in be talking to family at bedside. Monitors in place VSS. Pt denies any needs at this time. Will continue to monitor.

## 2020-01-19 NOTE — PROGRESS NOTES
2 RN skin check complete.    Devices in place: None at this time.    No confirmed or potentially new pressure ulcers observed.  All bony prominences and points of pressure observed.  All skin intact, no areas of concern at this time.    Pt turns self side-to-side.

## 2020-01-19 NOTE — PROGRESS NOTES
Bedside report received from ED RN.  Assessment completed.    Pt AOx4.  Calls appropriately.  Pt is up self with a steady gait.  Pain is well controlled, 0/10.  Will continue to monitor and medicate per MAR.    Skin intact, no areas of concern.    Pt denies SOB. RA O2, saturating >90%.  Pt is on NPO with sips diet. + complaints of n/v, medicated per MAR.  +void.  -BM, last PTA 1/19/2020 per pt. -flatus.      POC discussed with the pt.  Belongings and call light within reach.  Bed locked and in low position.  Hourly rounding in place.  All needs met at this time.

## 2020-01-19 NOTE — ED PROVIDER NOTES
ED PROVIDER NOTE     Scribed for Emerson Guerrero M.D. by Sona Ledesma. 1/18/2020, 10:14 PM.    CHIEF COMPLAINT  Chief Complaint   Patient presents with   • Epigastric Pain     Started yesterday at 1930. Reports epigastric pain radiating straight to back. Reports nausea, denies vomiting of diarrhea.        HPI    Primary care provider: None   Means of arrival: Walk-in  History obtained from: Patient  History limited by: none    Mattie Alatorre is a 34 y.o. female who presents with acute, constant, moderate epigastric pain which began one day ago at 7:30 PM with no alleviation since onset. The patient reports that last night she ate Chinese food that was greasy in appearance. After eating the Chinese foods, the patient reports that she developed sudden, moderate epigastric pain. Shortly after the onset of her epigastric pain, the patient reports developing symptoms of nausea but denies vomiting. The patient notes that she attempted to drink Chicken Soup earlier today, however it only exacerbated her pain which prompted her to take Ibuprofen. She notes that the Ibuprofen provided mild relief of her pain. The patient further denies recent symptoms of fevers, chills, runny nose, nasal congestion, cough, shortness of breath, chest pain, bilateral lower extremity pain, painful urination, frequent urination, diarrhea, bloody stools. She notes that her pain radiates to her back and describes her pain as sharp in nature. The patient notes that she has experienced similar symptoms three to four months ago but notes that her pain resolved with duration and without intervention. She reports that she drank apple cider vinegar and lemon water with minimal relief of her pain. No major past medical or surgical history was reported. The patient does not take any daily medications and has no known allergies to medications. She denies drinking alcohol on a regular basis or smoking cigarettes. Her last menstrual period was one  "week ago and she denies current chance of pregnancy. The patient denies taking any birth control on a daily basis. She has no known allergies to medications.       REVIEW OF SYSTEMS  Constitutional: Negative for fever or chills.   HENT: Negative for rhinorrhea or sore throat.    Respiratory: Negative for cough or shortness of breath.    Cardiovascular: Negative for chest pain or palpitations.   Gastrointestinal: Abdominal pain and nausea. Negative for vomiting.  Genitourinary: Negative for dysuria or flank pain.   Musculoskeletal: Negative for back pain or joint pain.   Skin: Negative for itching or rash.   Neurological: Negative for sensory or motor changes.   All other systems reviewed and are negative.    PAST MEDICAL HISTORY   has a past medical history of Migraines.    PAST FAMILY HISTORY  History reviewed. No pertinent family history.    SOCIAL HISTORY  Social History     Tobacco Use   • Smoking status: Never Smoker   • Smokeless tobacco: Never Used   Substance and Sexual Activity   • Alcohol use: No   • Drug use: No   • Sexual activity: Not on file       SURGICAL HISTORY   has a past surgical history that includes pterygium excision (10/3/2014).    CURRENT MEDICATIONS  The patient does not take any daily medications    ALLERGIES  No Known Allergies    PHYSICAL EXAM  VITAL SIGNS: /73   Pulse (!) 54   Temp 36.7 °C (98.1 °F) (Oral)   Resp 16   Ht 1.549 m (5' 1\")   Wt 77.1 kg (169 lb 15.6 oz)   SpO2 99%   BMI 32.12 kg/m²    Pulse ox interpretation: On room air, I interpret this pulse ox as normal.  Constitutional: Well-developed, well-nourished. Sitting up.   HEENT: Normocephalic, atraumatic. Posterior pharynx clear, mucous membranes dry.  Eyes:  EOMI. Normal sclerae.  Neck: Supple, nontender.  Chest/Pulmonary: Clear to ausculation bilaterally, no wheezes or rhonchi.  Cardiovascular: Regular rate and rhythm, no murmur.   Abdomen: Soft, Epigastric and right upper quadrant abdominal tenderness with " mild guarding no rebound, or masses.  Back: No CVA or midline tenderness.   Musculoskeletal: No deformity or edema.  Neuro: Clear speech, normal coordination, cranial nerves II-XII grossly intact, no focal asymmetry or sensory deficits.   Psych: Normal mood and affect.  Skin: No rashes, warm and dry.      DIAGNOSTIC STUDIES / PROCEDURES    LABS & EKG  Results for orders placed or performed during the hospital encounter of 01/18/20   CBC WITH DIFFERENTIAL   Result Value Ref Range    WBC 7.6 4.8 - 10.8 K/uL    RBC 4.99 4.20 - 5.40 M/uL    Hemoglobin 14.8 12.0 - 16.0 g/dL    Hematocrit 42.9 37.0 - 47.0 %    MCV 86.0 81.4 - 97.8 fL    MCH 29.7 27.0 - 33.0 pg    MCHC 34.5 33.6 - 35.0 g/dL    RDW 38.1 35.9 - 50.0 fL    Platelet Count 290 164 - 446 K/uL    MPV 9.5 9.0 - 12.9 fL    Neutrophils-Polys 65.50 44.00 - 72.00 %    Lymphocytes 27.80 22.00 - 41.00 %    Monocytes 5.90 0.00 - 13.40 %    Eosinophils 0.40 0.00 - 6.90 %    Basophils 0.30 0.00 - 1.80 %    Immature Granulocytes 0.10 0.00 - 0.90 %    Nucleated RBC 0.00 /100 WBC    Neutrophils (Absolute) 5.00 2.00 - 7.15 K/uL    Lymphs (Absolute) 2.12 1.00 - 4.80 K/uL    Monos (Absolute) 0.45 0.00 - 0.85 K/uL    Eos (Absolute) 0.03 0.00 - 0.51 K/uL    Baso (Absolute) 0.02 0.00 - 0.12 K/uL    Immature Granulocytes (abs) 0.01 0.00 - 0.11 K/uL    NRBC (Absolute) 0.00 K/uL   COMP METABOLIC PANEL   Result Value Ref Range    Sodium 137 135 - 145 mmol/L    Potassium 3.5 (L) 3.6 - 5.5 mmol/L    Chloride 105 96 - 112 mmol/L    Co2 23 20 - 33 mmol/L    Anion Gap 9.0 0.0 - 11.9    Glucose 101 (H) 65 - 99 mg/dL    Bun 8 8 - 22 mg/dL    Creatinine 0.58 0.50 - 1.40 mg/dL    Calcium 9.0 8.5 - 10.5 mg/dL    AST(SGOT) 301 (H) 12 - 45 U/L    ALT(SGPT) 306 (H) 2 - 50 U/L    Alkaline Phosphatase 141 (H) 30 - 99 U/L    Total Bilirubin 2.0 (H) 0.1 - 1.5 mg/dL    Albumin 4.3 3.2 - 4.9 g/dL    Total Protein 7.0 6.0 - 8.2 g/dL    Globulin 2.7 1.9 - 3.5 g/dL    A-G Ratio 1.6 g/dL   LIPASE   Result  Value Ref Range    Lipase 35 11 - 82 U/L   TROPONIN   Result Value Ref Range    Troponin T <6 6 - 19 ng/L   HCG QUAL SERUM   Result Value Ref Range    Beta-Hcg Qualitative Serum Negative Negative   ESTIMATED GFR   Result Value Ref Range    GFR If African American >60 >60 mL/min/1.73 m 2    GFR If Non African American >60 >60 mL/min/1.73 m 2   ACETAMINOPHEN   Result Value Ref Range    Acetaminophen -Tylenol <10 10 - 30 ug/mL   HEPATITIS PANEL ACUTE(4 COMPONENTS)   Result Value Ref Range    Hepatitis B Surface Antigen Negative Negative    Hepatitis B Cors Ab,IgM Negative Negative    Hepatitis A Virus Ab, IgM Negative Negative    Hepatitis C Antibody Negative Negative   PT/INR   Result Value Ref Range    PT 16.1 (H) 12.0 - 14.6 sec    INR 1.26 (H) 0.87 - 1.13   EKG   Result Value Ref Range    Report       [facility] Emergency Dept.    Test Date:  2020  Pt Name:    JEREMÍAS GERMAN                 Department: ER  MRN:        [id number]                      Room:       [facility]  Gender:     Female                       Technician: [id number]  :        [id number]                   Requested By:ER TRIAGE PROTOCOL  Order #:    [id number]                    Reading MD: Emerson Guerrero MD    Measurements  Intervals                                Axis  Rate:       50                           P:          43  OK:         182                          QRS:        36  QRSD:       118                          T:          4  QT:         444  QTc:        405    Interpretive Statements  Slow sinus arrhythmia  Incomplete right bundle branch block  Low voltage, precordial leads  Compared to ECG 2019 00:28:29  Incomplete right bundle-branch block now present  Low QRS voltage now present  Sinus rhythm no longer present  T-wave abnormality no longer present  No STEMI  Electronically Ruby d On 2020 11:28:39 PST by Emerson Guerrero MD       All labs reviewed by me    RADIOLOGY  US-RUQ   Final Result       Cholelithiasis. No cholecystitis.      DX-CHEST-PORTABLE (1 VIEW)   Final Result      No acute cardiopulmonary abnormality.      [id number]    (Results Pending)       COURSE & MEDICAL DECISION MAKING    This is a 34 y.o. female who presents with acute, constant, moderate epigastric pain which began one day ago at 7:30 PM with no alleviation since onset. She is experiencing additional symptoms of nausea without emesis.    Differential Diagnosis includes but is not limited to:  Cholelithiasis, cholecystitis, pancreatitis, gastritis. Less like, ACS.     ED Course:  10:14 PM Patient was seen and evaluated at bedside. Patient presents to the ED for the above complaint.The patient is afebrile, well-appearing with dry mucous membranes and Epigastric and right upper quadrant abdominal tenderness with mild guarding but with an otherwise normal physical exam with stable vital signs. Since the patient is experiencing right upper quadrant tenderness, a ultrasound of the respective area will be performed. Further plan of care includes ordering lab work and treating the patient for her symptoms. Her lipase function will be ordered, Patient's verbalizes their understanding and agreement to the plan of care. Ordered estimated GFR, hCG qual serum, troponin, lipase, CMP, CBC with differential, US-RUQ and Dx-chest. Patient was medicated with Morphine 4 mg and Pepcid 20 mg for her abdominal pain, Zofran 4 mg for her nausea and lactated ringers infusion 1,000 mL for dehydration.     The patient will receive IV fluids for concerns of dehydration and NPO in case of surgical process is present.     11:54 PM Reviewed patient's lab work and imaging, her total bilirubin is 2.0 and her ultrasound is consistent with cholelithiasis. At this time, Dr. French (General Surger) was paged because she does have significantly elevated transaminases    11:56 PM I discussed the patient's case and the above findings with Dr. French (General Surgery)  who instructs to obtain an MRCP and consult with medicine for hospitalization. Dr. French states that she will follow along with the patient's case.    12:00 AM At this time, Dr. Smith (Hospitalist) was paged    12:13 AM I discussed the patient's case and the above findings with Dr. Smith (Hospitalist) who agrees to evaluate the patient for hospitalization.    12:15 AM Recheck. I updated the patient on her lab work and imaging results which are consistent with cholelithiasis. She was informed that she will be hospitalized to this facility for further lab work up and imaging and for continuous monitoring of her conidtion. Patient verbalizes her understanding and agreement to the plan of admission.  She is feeling better after fluids thus I feel she has had a positive response to parenteral rehydration.  She is stable for transfer to the hospital in guarded condition.      Medications   senna-docusate (PERICOLACE or SENOKOT S) 8.6-50 MG per tablet 2 Tab (2 Tabs Oral Given 1/19/20 0503)     And   polyethylene glycol/lytes (MIRALAX) PACKET 1 Packet (has no administration in time range)     And   magnesium hydroxide (MILK OF MAGNESIA) suspension 30 mL (has no administration in time range)     And   bisacodyl (DULCOLAX) suppository 10 mg (has no administration in time range)   NS infusion ( Intravenous New Bag 1/19/20 0203)   Pharmacy Consult Request ...Pain Management Review 1 Each (has no administration in time range)     And   oxyCODONE immediate-release (ROXICODONE) tablet 5 mg (has no administration in time range)     And   oxyCODONE immediate-release (ROXICODONE) tablet 10 mg (has no administration in time range)     And   HYDROmorphone pf (DILAUDID) injection 0.5 mg (has no administration in time range)   ondansetron (ZOFRAN) syringe/vial injection 4 mg (4 mg Intravenous Given 1/19/20 0147)   ondansetron (ZOFRAN ODT) dispertab 4 mg (has no administration in time range)   promethazine (PHENERGAN) tablet 12.5-25 mg  (has no administration in time range)   promethazine (PHENERGAN) suppository 12.5-25 mg (has no administration in time range)   prochlorperazine (COMPAZINE) injection 5-10 mg (10 mg Intravenous Given 1/19/20 0203)   lactated ringers infusion (BOLUS) (0 mL Intravenous Stopped 1/18/20 2300)   morphine (pf) 4 MG/ML injection 4 mg (4 mg Intravenous Given 1/18/20 2300)   ondansetron (ZOFRAN) syringe/vial injection 4 mg (4 mg Intravenous Given 1/18/20 2300)   famotidine (PEPCID) injection 20 mg (20 mg Intravenous Given 1/18/20 2300)   cefTRIAXone (ROCEPHIN) 2 g in  mL IVPB (0 g Intravenous Stopped 1/19/20 0150)   metroNIDAZOLE (FLAGYL) IVPB 500 mg (0 mg Intravenous Stopped 1/19/20 0315)   potassium chloride SA (Kdur) tablet 40 mEq (0 mEq Oral Recheck 1/19/20 0600)       FINAL IMPRESSION  1. Acute abdominal pain    2. Symptomatic cholelithiasis    3. Abnormal LFTs    4. Nausea    5. Dehydration          -Hospitalized for further work-up and treatment.-       Pertinent Labs & Imaging studies reviewed and verified by myself, as well as nursing notes and the patient's past medical, family, and social histories (See chart for details).    Results, exam findings, clinical impression, presumed diagnosis, treatment options, and plan for hospitalization were discussed with the patient, and they verbalized understanding, agreed with, and appreciated the plan of care.    ISona (Dalila), am scribing for, and in the presence of, Emerson Guerrero M.D..    Electronically signed by: Sona Ledesma (Dalila), 1/18/2020    Emerson NIELSEN M.D. personally performed the services described in this documentation, as scribed by Sona Ledesma in my presence, and it is both accurate and complete.    Portions of this record were made with voice recognition software.  Despite my review, spelling/grammar/context errors may still remain.  Interpretation of this chart should be taken in this context.    C    The note accurately  reflects work and decisions made by me.  Emerson Guerrero M.D.  1/19/2020  11:30 AM

## 2020-01-19 NOTE — PROGRESS NOTES
"Received report of patient at start of shift. Patient is AOx4, no complaints of pain. Family at bedside. Assessment complete, on room air. Patient pending MRCP. Per MRI tech, procedure to occur \"sometime this afternoon.\" Screening form completed. Patient voiced frustration regarding wait time for MRCP, active listening and one on one conversation provided. Rounded with hospitalist-Dr. Peguero, updated this MD of patient's heart rate between 40s-50s and BPs of 90s/50s.Patient encouraged to use call light for assistance. Safety precautions in place.  "

## 2020-01-19 NOTE — CARE PLAN
Problem: Communication  Goal: The ability to communicate needs accurately and effectively will improve  Outcome: PROGRESSING AS EXPECTED  Note:   Reviewed POC with pt.  Call light within reach and education provided on proper use.  All questions answered and needs met at this time.     Problem: Pain Management  Goal: Pain level will decrease to patient's comfort goal  Outcome: PROGRESSING AS EXPECTED  Note:   Pain rating scale discussed with pt.  Pt declines pain at this time, however, pharm and non-pharm interventions are reviewed with the pt.

## 2020-01-19 NOTE — ED TRIAGE NOTES
"Chief Complaint   Patient presents with   • Epigastric Pain     Started yesterday at 1930. Reports epigastric pain radiating straight to back. Reports nausea, denies vomiting of diarrhea.      /73   Pulse (!) 54   Temp 36.7 °C (98.1 °F) (Oral)   Resp 16   Ht 1.549 m (5' 1\")   Wt 77.1 kg (169 lb 15.6 oz)   SpO2 99%   BMI 32.12 kg/m²     PT to ER for above complaint.    Denies hx of cardiac or aortic disease    R /73. HR 54.  L IOANA 120/79. HR 49.     Pt denies pain with urination, denies blood in urine. Reports last period was last week. Denies daily meds. Denies hx of heartburn.     EKG ordered. Charge notified.  "

## 2020-01-19 NOTE — PROGRESS NOTES
Pt seen and examined  Has cholelithiasis and elevated LFTs  Awaiting MRCP  Plan lap gregory +/- ERCP depending on findings.

## 2020-01-19 NOTE — H&P
Hospital Medicine History & Physical Note    Date of Service  1/19/2020    Primary Care Physician  CHECO Bill.    Consultants  none    Code Status  full    Chief Complaint  epigsatric abd pain     History of Presenting Illness  34 y.o. female who presented 1/18/2020 with past medical history of obesity, migraines who presents with epigastric abdominal pain.  This patient started having epigastric abdominal pain 7:30 PM yesterday.  She states over the last month.  She has been trying to eat very clean and doing detoxifications.  Yesterday her kids wanted to eat Chinese food.  It was significantly greasy.  After eating Chinese food she started developing epigastric abdominal pain.  Radiating to the right upper quadrant associated nausea no vomiting.  She ate chicken soup earlier but exacerbated her pain.  She did take ibuprofen which did not provide any relief to her pain.  Otherwise no known alleviating or exacerbating factors to her symptoms.  She will be admitted to the hospital with concerns of biliary colic.    Review of Systems  Review of Systems   Constitutional: Positive for malaise/fatigue. Negative for chills and fever.   HENT: Negative for congestion, hearing loss and tinnitus.    Eyes: Negative for blurred vision, double vision and discharge.   Respiratory: Negative for cough, hemoptysis and shortness of breath.    Cardiovascular: Negative for chest pain, palpitations and leg swelling.   Gastrointestinal: Positive for abdominal pain and nausea. Negative for heartburn and vomiting.   Genitourinary: Negative for dysuria and flank pain.   Musculoskeletal: Negative for joint pain and myalgias.   Skin: Negative for rash.   Neurological: Negative for dizziness, sensory change, speech change, focal weakness and weakness.   Endo/Heme/Allergies: Negative for environmental allergies. Does not bruise/bleed easily.   Psychiatric/Behavioral: Negative for depression, hallucinations and substance abuse.        Past Medical History   has a past medical history of Migraines.    Surgical History   has a past surgical history that includes pterygium excision (10/3/2014).     Family History  Reviewed and not pertinent     Social History   reports that she has never smoked. She has never used smokeless tobacco. She reports that she does not drink alcohol or use drugs.    Allergies  No Known Allergies    Medications  Prior to Admission Medications   Prescriptions Last Dose Informant Patient Reported? Taking?   ibuprofen (MOTRIN) 600 MG Tab   No No   Sig: Take 1 Tab by mouth every 6 hours as needed (For cramping after delivery; do not give if patient is receiving ketorolac (Toradol)).      Facility-Administered Medications: None       Physical Exam  Temp:  [36.7 °C (98.1 °F)] 36.7 °C (98.1 °F)  Pulse:  [54] 54  Resp:  [16] 16  BP: (120)/(73) 120/73  SpO2:  [99 %] 99 %    Physical Exam  Vitals signs reviewed.   Constitutional:       Appearance: Normal appearance. She is ill-appearing.   HENT:      Head: Normocephalic and atraumatic.      Nose: No congestion.      Mouth/Throat:      Mouth: Mucous membranes are dry.   Eyes:      Extraocular Movements: Extraocular movements intact.      Pupils: Pupils are equal, round, and reactive to light.   Neck:      Musculoskeletal: Normal range of motion and neck supple. No muscular tenderness.   Cardiovascular:      Rate and Rhythm: Normal rate and regular rhythm.      Pulses: Normal pulses.      Heart sounds: Normal heart sounds. No murmur.   Pulmonary:      Effort: Pulmonary effort is normal. No respiratory distress.      Breath sounds: Normal breath sounds. No stridor.   Abdominal:      General: Bowel sounds are normal. There is no distension.      Palpations: Abdomen is soft.      Tenderness: There is tenderness.      Comments: Epigastric ttp    Musculoskeletal:         General: No swelling or deformity.   Skin:     General: Skin is warm and dry.      Capillary Refill: Capillary  refill takes 2 to 3 seconds.   Neurological:      General: No focal deficit present.      Mental Status: She is alert and oriented to person, place, and time.   Psychiatric:         Mood and Affect: Mood normal.         Behavior: Behavior normal.         Thought Content: Thought content normal.         Judgment: Judgment normal.         Laboratory:  Recent Labs     01/18/20  2223   WBC 7.6   RBC 4.99   HEMOGLOBIN 14.8   HEMATOCRIT 42.9   MCV 86.0   MCH 29.7   MCHC 34.5   RDW 38.1   PLATELETCT 290   MPV 9.5     Recent Labs     01/18/20  2223   SODIUM 137   POTASSIUM 3.5*   CHLORIDE 105   CO2 23   GLUCOSE 101*   BUN 8   CREATININE 0.58   CALCIUM 9.0     Recent Labs     01/18/20  2223   ALTSGPT 306*   ASTSGOT 301*   ALKPHOSPHAT 141*   TBILIRUBIN 2.0*   LIPASE 35   GLUCOSE 101*         No results for input(s): NTPROBNP in the last 72 hours.      Recent Labs     01/18/20  2223   TROPONINT <6       Urinalysis:    No results found     Imaging:  US-RUQ   Final Result      Cholelithiasis. No cholecystitis.      DX-CHEST-PORTABLE (1 VIEW)   Final Result      No acute cardiopulmonary abnormality.      [id number]    (Results Pending)         Assessment/Plan:  I anticipate this patient is appropriate for observation status at this time.    * Epigastric abdominal pain  Assessment & Plan  Suspect likely biliary colic   No evidence of acute cholecysitis on RUQ ultrasound   NPO for now   IVF, anti emetics and pain control  Defer abx at this time   Dr. Cortés surgery consulted  Plan for MRCP in am for eval    Hypokalemia  Assessment & Plan  Replace and recheck labs    Obesity  Assessment & Plan  Encouraged weight loss    Abnormal LFTs  Assessment & Plan  Cont to trend, suspect gallbladder etiology vs choledocholethiasis       VTE prophylaxis: scd

## 2020-01-19 NOTE — CARE PLAN
Problem: Knowledge Deficit  Goal: Knowledge of disease process/condition, treatment plan, diagnostic tests, and medications will improve  Outcome: PROGRESSING AS EXPECTED  Note:   Patient updated on plan of care including ordered MRI for this afternoon.     Problem: Pain Management  Goal: Pain level will decrease to patient's comfort goal  Outcome: PROGRESSING AS EXPECTED  Note:   Patient has no complaints of pain so far this shift.

## 2020-01-20 PROBLEM — E87.6 HYPOKALEMIA: Status: RESOLVED | Noted: 2020-01-19 | Resolved: 2020-01-20

## 2020-01-20 PROBLEM — K80.50 CHOLEDOCHOLITHIASIS: Status: ACTIVE | Noted: 2020-01-20

## 2020-01-20 LAB
ALBUMIN SERPL BCP-MCNC: 3.8 G/DL (ref 3.2–4.9)
ALBUMIN/GLOB SERPL: 1.9 G/DL
ALP SERPL-CCNC: 120 U/L (ref 30–99)
ALT SERPL-CCNC: 185 U/L (ref 2–50)
ANION GAP SERPL CALC-SCNC: 7 MMOL/L (ref 0–11.9)
AST SERPL-CCNC: 66 U/L (ref 12–45)
BASOPHILS # BLD AUTO: 0.4 % (ref 0–1.8)
BASOPHILS # BLD: 0.02 K/UL (ref 0–0.12)
BILIRUB SERPL-MCNC: 0.8 MG/DL (ref 0.1–1.5)
BUN SERPL-MCNC: 6 MG/DL (ref 8–22)
CALCIUM SERPL-MCNC: 8.5 MG/DL (ref 8.5–10.5)
CHLORIDE SERPL-SCNC: 108 MMOL/L (ref 96–112)
CO2 SERPL-SCNC: 23 MMOL/L (ref 20–33)
CREAT SERPL-MCNC: 0.57 MG/DL (ref 0.5–1.4)
EOSINOPHIL # BLD AUTO: 0.12 K/UL (ref 0–0.51)
EOSINOPHIL NFR BLD: 2.4 % (ref 0–6.9)
ERYTHROCYTE [DISTWIDTH] IN BLOOD BY AUTOMATED COUNT: 40.7 FL (ref 35.9–50)
GLOBULIN SER CALC-MCNC: 2 G/DL (ref 1.9–3.5)
GLUCOSE SERPL-MCNC: 81 MG/DL (ref 65–99)
HCT VFR BLD AUTO: 39.3 % (ref 37–47)
HGB BLD-MCNC: 12.8 G/DL (ref 12–16)
IMM GRANULOCYTES # BLD AUTO: 0.01 K/UL (ref 0–0.11)
IMM GRANULOCYTES NFR BLD AUTO: 0.2 % (ref 0–0.9)
LYMPHOCYTES # BLD AUTO: 2.41 K/UL (ref 1–4.8)
LYMPHOCYTES NFR BLD: 48.8 % (ref 22–41)
MCH RBC QN AUTO: 29.4 PG (ref 27–33)
MCHC RBC AUTO-ENTMCNC: 32.6 G/DL (ref 33.6–35)
MCV RBC AUTO: 90.1 FL (ref 81.4–97.8)
MONOCYTES # BLD AUTO: 0.27 K/UL (ref 0–0.85)
MONOCYTES NFR BLD AUTO: 5.5 % (ref 0–13.4)
NEUTROPHILS # BLD AUTO: 2.11 K/UL (ref 2–7.15)
NEUTROPHILS NFR BLD: 42.7 % (ref 44–72)
NRBC # BLD AUTO: 0 K/UL
NRBC BLD-RTO: 0 /100 WBC
PLATELET # BLD AUTO: 219 K/UL (ref 164–446)
PMV BLD AUTO: 9.5 FL (ref 9–12.9)
POTASSIUM SERPL-SCNC: 3.5 MMOL/L (ref 3.6–5.5)
PROT SERPL-MCNC: 5.8 G/DL (ref 6–8.2)
RBC # BLD AUTO: 4.36 M/UL (ref 4.2–5.4)
SODIUM SERPL-SCNC: 138 MMOL/L (ref 135–145)
WBC # BLD AUTO: 4.9 K/UL (ref 4.8–10.8)

## 2020-01-20 PROCEDURE — 700102 HCHG RX REV CODE 250 W/ 637 OVERRIDE(OP): Performed by: HOSPITALIST

## 2020-01-20 PROCEDURE — 85025 COMPLETE CBC W/AUTO DIFF WBC: CPT

## 2020-01-20 PROCEDURE — 770006 HCHG ROOM/CARE - MED/SURG/GYN SEMI*

## 2020-01-20 PROCEDURE — A9270 NON-COVERED ITEM OR SERVICE: HCPCS | Performed by: HOSPITALIST

## 2020-01-20 PROCEDURE — 99233 SBSQ HOSP IP/OBS HIGH 50: CPT | Performed by: HOSPITALIST

## 2020-01-20 PROCEDURE — G0378 HOSPITAL OBSERVATION PER HR: HCPCS

## 2020-01-20 PROCEDURE — 700105 HCHG RX REV CODE 258: Performed by: HOSPITALIST

## 2020-01-20 PROCEDURE — 80053 COMPREHEN METABOLIC PANEL: CPT

## 2020-01-20 PROCEDURE — 36415 COLL VENOUS BLD VENIPUNCTURE: CPT

## 2020-01-20 RX ORDER — OXYCODONE HYDROCHLORIDE 5 MG/1
5 TABLET ORAL EVERY 4 HOURS PRN
Qty: 15 TAB | Refills: 0 | Status: SHIPPED | OUTPATIENT
Start: 2020-01-20 | End: 2020-01-25

## 2020-01-20 RX ADMIN — SENNOSIDES AND DOCUSATE SODIUM 2 TABLET: 8.6; 5 TABLET ORAL at 05:40

## 2020-01-20 RX ADMIN — SENNOSIDES AND DOCUSATE SODIUM 2 TABLET: 8.6; 5 TABLET ORAL at 17:44

## 2020-01-20 RX ADMIN — SODIUM CHLORIDE: 9 INJECTION, SOLUTION INTRAVENOUS at 00:43

## 2020-01-20 RX ADMIN — SODIUM CHLORIDE: 9 INJECTION, SOLUTION INTRAVENOUS at 17:45

## 2020-01-20 RX ADMIN — SODIUM CHLORIDE: 9 INJECTION, SOLUTION INTRAVENOUS at 10:59

## 2020-01-20 RX ADMIN — OXYCODONE HYDROCHLORIDE 5 MG: 5 TABLET ORAL at 12:01

## 2020-01-20 ASSESSMENT — ENCOUNTER SYMPTOMS
CHILLS: 0
ABDOMINAL PAIN: 0
SHORTNESS OF BREATH: 0
FEVER: 0
ABDOMINAL PAIN: 1
NAUSEA: 1

## 2020-01-20 NOTE — PROGRESS NOTES
Trauma / Surgical Daily Progress Note    Date of Service  1/20/2020    Chief Complaint  34 y.o. female admitted 1/18/2020 with Biliary colic    Interval Events  MRCP pos for choledocholithiasis. Needs ERCP. Gi consulted - Paul. Will follow with jonathan jenkins.    Review of Systems  Review of Systems   Gastrointestinal: Negative for abdominal pain.        Vital Signs for last 24 hours  Temp:  [36.3 °C (97.3 °F)-37.1 °C (98.7 °F)] 36.5 °C (97.7 °F)  Pulse:  [47-87] 47  Resp:  [16-18] 16  BP: (110-117)/(66-74) 114/66  SpO2:  [97 %-99 %] 97 %    Hemodynamic parameters for last 24 hours       Respiratory Data     Respiration: 16, Pulse Oximetry: 97 %             Physical Exam  Physical Exam  Pulmonary:      Effort: Pulmonary effort is normal.   Abdominal:      General: There is no distension.      Palpations: Abdomen is soft.      Tenderness: There is no tenderness.   Neurological:      Mental Status: She is alert. Mental status is at baseline.         Laboratory  Recent Results (from the past 24 hour(s))   CBC with Differential    Collection Time: 01/20/20  1:07 AM   Result Value Ref Range    WBC 4.9 4.8 - 10.8 K/uL    RBC 4.36 4.20 - 5.40 M/uL    Hemoglobin 12.8 12.0 - 16.0 g/dL    Hematocrit 39.3 37.0 - 47.0 %    MCV 90.1 81.4 - 97.8 fL    MCH 29.4 27.0 - 33.0 pg    MCHC 32.6 (L) 33.6 - 35.0 g/dL    RDW 40.7 35.9 - 50.0 fL    Platelet Count 219 164 - 446 K/uL    MPV 9.5 9.0 - 12.9 fL    Neutrophils-Polys 42.70 (L) 44.00 - 72.00 %    Lymphocytes 48.80 (H) 22.00 - 41.00 %    Monocytes 5.50 0.00 - 13.40 %    Eosinophils 2.40 0.00 - 6.90 %    Basophils 0.40 0.00 - 1.80 %    Immature Granulocytes 0.20 0.00 - 0.90 %    Nucleated RBC 0.00 /100 WBC    Neutrophils (Absolute) 2.11 2.00 - 7.15 K/uL    Lymphs (Absolute) 2.41 1.00 - 4.80 K/uL    Monos (Absolute) 0.27 0.00 - 0.85 K/uL    Eos (Absolute) 0.12 0.00 - 0.51 K/uL    Baso (Absolute) 0.02 0.00 - 0.12 K/uL    Immature Granulocytes (abs) 0.01 0.00 - 0.11 K/uL    NRBC  (Absolute) 0.00 K/uL   Comp Metabolic Panel (CMP)    Collection Time: 01/20/20  1:07 AM   Result Value Ref Range    Sodium 138 135 - 145 mmol/L    Potassium 3.5 (L) 3.6 - 5.5 mmol/L    Chloride 108 96 - 112 mmol/L    Co2 23 20 - 33 mmol/L    Anion Gap 7.0 0.0 - 11.9    Glucose 81 65 - 99 mg/dL    Bun 6 (L) 8 - 22 mg/dL    Creatinine 0.57 0.50 - 1.40 mg/dL    Calcium 8.5 8.5 - 10.5 mg/dL    AST(SGOT) 66 (H) 12 - 45 U/L    ALT(SGPT) 185 (H) 2 - 50 U/L    Alkaline Phosphatase 120 (H) 30 - 99 U/L    Total Bilirubin 0.8 0.1 - 1.5 mg/dL    Albumin 3.8 3.2 - 4.9 g/dL    Total Protein 5.8 (L) 6.0 - 8.2 g/dL    Globulin 2.0 1.9 - 3.5 g/dL    A-G Ratio 1.9 g/dL   ESTIMATED GFR    Collection Time: 01/20/20  1:07 AM   Result Value Ref Range    GFR If African American >60 >60 mL/min/1.73 m 2    GFR If Non African American >60 >60 mL/min/1.73 m 2       Fluids    Intake/Output Summary (Last 24 hours) at 1/20/2020 0840  Last data filed at 1/20/2020 0400  Gross per 24 hour   Intake 920 ml   Output --   Net 920 ml       Core Measures & Quality Metrics  Labs reviewed, Medications reviewed and Radiology images reviewed  Hugo catheter: No Hugo                  MESHA Score  ETOH Screening    Assessment/Plan  No new Assessment & Plan notes have been filed under this hospital service since the last note was generated.  Service: Surgery General      Discussed patient condition with Family, RN and Patient Dr. Miller.  CRITICAL CARE TIME EXCLUDING PROCEDURES: 20  minutes

## 2020-01-20 NOTE — PROGRESS NOTES
Gi consult dictated:  Impression:  1. Choledocholithiasis on MRCP  2. Gallstones with recent epigastric pain, NV.  3. Migraines.   Plan:  1. ERCP. Procedure, risks, alternatives, benefits discussed. Informed consent.  ERCP arranged for tomorrow PM is the first I can get.  NPO MIN.

## 2020-01-20 NOTE — CARE PLAN
Problem: Discharge Barriers/Planning  Goal: Patient's continuum of care needs will be met  Outcome: PROGRESSING AS EXPECTED  Note:   Pt updated on POC and expected course of stay.     Problem: Fluid Volume:  Goal: Will maintain balanced intake and output  Outcome: PROGRESSING AS EXPECTED  Note:   Pt receiving IV fluids.  PO intake being encouraged.

## 2020-01-20 NOTE — PROGRESS NOTES
Received report of patient at start of shift. Patient is AOx4, medicated with PRN oxycodone for complaints of abdominal pain. Significant other at bedside. Assessment complete, on room air. Patient eager to discharge hospital, requesting to have procedures completed as an outpatient. Rounded with Dr. Peguero at bedside. Plan for patient to have ERCP completed as an outpatient tomorrow at 3pm. Patient then to follow up with Dr. French as an outpatient for surgery. Discharge orders placed by MD. Plan of care discussed with patient. Safety precautions in place.

## 2020-01-20 NOTE — PROGRESS NOTES
Ms. Alatorre feels better. Her symptoms are consistent with biliary colic though LFTs elevated. MRCP still pending. Dr. French stopped by. Okay for clears tonight and NPO midnight.

## 2020-01-20 NOTE — DISCHARGE SUMMARY
Discharge Summary    CHIEF COMPLAINT ON ADMISSION  Chief Complaint   Patient presents with   • Epigastric Pain     Started yesterday at 1930. Reports epigastric pain radiating straight to back. Reports nausea, denies vomiting of diarrhea.        Reason for Admission  Abd pain, back pain     Admission Date  1/18/2020    CODE STATUS  Full Code    HPI & HOSPITAL COURSE  This is a 34 y.o. female here with abdominal pain. Ms. Alatorre has a history of migraine headaches that presented to the ER with abdominal pain that had been intermittent over the past few weeks though markedly worse after eating Chinese food. She was found to have elevated LFTs with a normal lipase. Liver ultrasound was negative. She was given IV fluids and IV antiemetics with narcotic pain meds. Today her MRCP is consistent with choledocholithiasis. An ERCP has been scheduled tomorrow at 3:30 by Dr. Miller. His office was able to obtain pre-authorization for the procedure and Ms. Alatorre and her  are extremely anxious to leave the hospital.   I have contacted Dr. rFench and she agrees to have Ms. Alatorre see her either Wednesday or Friday in the office in order to schedule a cholecystectomy. She continues to require oral oxycodone for pain control. She is currently not vomiting and is stable for discharge.      I wrote a prescription for 15 oxycodone tablets.     Therefore, she is discharged in good and stable condition to home with close outpatient follow-up.    She was under observation status.     Discharge Date  1/20    FOLLOW UP ITEMS POST DISCHARGE  She will call Dr. French's office for an appointment on Friday to schedule a cholecystectomy    DISCHARGE DIAGNOSES  Principal Problem:    Choledocholithiasis POA: Unknown  Active Problems:    Abnormal LFTs POA: Unknown    Epigastric abdominal pain POA: Unknown    Obesity POA: Unknown  Resolved Problems:    Hypokalemia POA: Unknown      FOLLOW UP  No future appointments.  Hien French M.D.  [address] [facility] [id number]  [place] [address]  [phone]    Schedule an appointment as soon as possible for a visit in 4 days        MEDICATIONS ON DISCHARGE     Medication List      START taking these medications      Instructions   oxyCODONE immediate-release 5 MG Tabs  Commonly known as:  ROXICODONE   Take 1 Tab by mouth every four hours as needed for Severe Pain for up to 15 doses.  Dose:  5 mg            Allergies  No Known Allergies    DIET  Orders Placed This Encounter   Procedures   • Diet Order Full Liquid     Standing Status:   Standing     Number of Occurrences:   1     Order Specific Question:   Diet:     Answer:   Full Liquid [11]       ACTIVITY  Clear liquid NPO midnight    CONSULTATIONS  Dr. Miller, GI  Dr. French, surgery      LABORATORY  Lab Results   Component Value Date    SODIUM 138 01/20/2020    POTASSIUM 3.5 (L) 01/20/2020    CHLORIDE 108 01/20/2020    CO2 23 01/20/2020    GLUCOSE 81 01/20/2020    BUN 6 (L) 01/20/2020    CREATININE 0.57 01/20/2020    Alkaline Phos 120  AST 66    Lipase 35  Pregnancy negative  Lab Results   Component Value Date    WBC 4.9 01/20/2020    HEMOGLOBIN 12.8 01/20/2020    HEMATOCRIT 39.3 01/20/2020    PLATELETCT 219 01/20/2020    imaging studies:  [id number]   Final Result      1. Choledocholithiasis with mild extrahepatic biliary dilatation.   2. Cholelithiasis. No cholecystitis.         US-RUQ   Final Result      Cholelithiasis. No cholecystitis.      DX-CHEST-PORTABLE (1 VIEW)   Final Result      No acute cardiopulmonary abnormality.            Total time of the discharge process exceeds 31 minutes.

## 2020-01-20 NOTE — CONSULTS
DATE OF SERVICE:  01/19/2020    SURGICAL CONSULTATION    REQUESTING PHYSICIAN:  Rolly Peguero MD    REASON FOR CONSULTATION:  The patient is a 34-year-old female who presented   with acute onset of severe abdominal pain that continued.  She had an episode   like this approximately few months ago, but it was self limited.  She started   having epigastric pain and continued to worsen.  She ultimately was brought to   the emergency room where she was found to have elevated liver function tests   as well as an elevated bilirubin and alkaline phosphatase, and an ultrasound   showing cholelithiasis and mildly dilated common duct.  She is being admitted   at this time for treatment.    PAST MEDICAL HISTORY:  ILLNESSES:  History of migraines.    PAST SURGICAL HISTORY:  Removal of a pterygium of the left eye.    MEDICATIONS:  Currently are none.    ALLERGIES:  None.    SOCIAL HISTORY:  She is .  She has 4 children.  She does not smoke or   drink.    REVIEW OF SYSTEMS:  Otherwise unremarkable.    PHYSICAL EXAMINATION:  VITAL SIGNS:  She is afebrile.  HEENT:  She is mildly icteric.  NECK:  Supple.  ABDOMEN:  Soft with minimal tenderness in the epigastrium.  She does not have   a formal Nixon sign at this point.  EXTREMITIES:  Without edema.  NEUROLOGIC:  Intact.    LABORATORY DATA:  White count was 7000.  Her transaminases are 301 and 306.    Her alkaline phosphatase is 141 and bilirubin is 2.    IMPRESSION:  A 34-year-old female with cholelithiasis and possible   choledocholithiasis.    PLAN:  She was admitted by the hospitalist.  She will have an MRCP.  If   negative, we will proceed with laparoscopic cholecystectomy; however, if it is   positive, she will need an ERCP followed by laparoscopic cholecystectomy.    This has been explained to her as well as the risks including bleeding,   infection, conversion to an open procedure, intra-abdominal injuries and   anesthetic risks.  She understands and wishes to  proceed.       ____________________________________     MD NIKO CHESTER / BRI    DD:  01/20/2020 12:20:50  DT:  01/20/2020 12:56:47    D#:  [id number]  Job#:  [id number]

## 2020-01-20 NOTE — PROGRESS NOTES
Bedside report received from day shift RN.  Assessment completed.    Pt AOx4.  Calls appropriately.  Pt very anxious when reviewing POC because she says she can't miss work/not be home with her children while her  is at work.  She stated that she does not want to stay in the hospital past tomorrow, and discussed the idea of leaving AMA.    Pt is up self.  Pain is well controlled, 0/10.  Will continue to monitor and medicate per MAR.    Skin is intact, no areas of concern.    Pt denies SOB. RA O2, saturating >90%.  Pt is tolerating a clear liquid diet, plans to be NPO at 0000 for ERCP 1/20/20. No complaints of n/v.  +void. -BM. -flatus.      POC discussed with the pt.  Belongings and call light within reach.  Bed locked and in low position.  Hourly rounding in place.  All needs met at this time.

## 2020-01-20 NOTE — CONSULTS
DATE OF SERVICE:  01/20/2020    GASTROENTEROLOGY CONSULTATION    REFERRING PHYSICIAN:  Hien French MD    REASON FOR CONSULTATION:  Choledocholithiasis on MRCP.    HISTORY OF PRESENT ILLNESS:  The patient is a 34-year-old woman admitted   yesterday because of epigastric pain, nausea and vomiting, which came on after   dinner 3 days ago.  She is currently pain free.  Her ultrasound on admission   showed gallstones and liver enzymes were elevated initially with AST of 301,   , alkaline phosphatase 141, and bilirubin of 2.  Liver enzymes are   better today, but an MRCP done last night shows several small stones in the   bile duct.  I discussed ERCP with her and her spouse at the bedside.    PAST MEDICAL HISTORY:  Migraine headaches.    PAST SURGICAL HISTORY:  None.    ALLERGIES:  None.    MEDICATIONS:  Ibuprofen p.r.n.    SOCIAL HISTORY:  She is  and works as a supervisor.    HABITS:  No tobacco or alcohol.    FAMILY HISTORY:  Her father had gallstones.  No colon or breast cancer in the   family.    REVIEW OF SYSTEMS:  CONSTITUTIONAL:  No fevers or chills.  NEUROLOGIC:  No strokes or seizures.  PSYCHIATRIC:  No problems.  PULMONARY:  No asthma or cough.  CARDIAC:  No exertional chest pain.  GASTROINTESTINAL:  Pain free at the moment.  GENITOURINARY:  No problems.  MUSCULOSKELETAL:  No problems.  ENDOCRINE:  No history of diabetes or thyroid disease.    PHYSICAL EXAMINATION:  GENERAL:  Alert female, in no distress.  VITAL SIGNS:  Temperature 36.6, pulse 63, respirations 18, blood pressure   107/76.  SKIN:  No skin lesions.  LYMPH NODES:  No nodes.  HEAD AND NECK:  Sclerae are anicteric.  Oropharynx clear.  Neck is supple.  LUNGS:  Clear.  HEART:  Regular rate and rhythm without murmur.  ABDOMEN:  Soft, nontender, no mass.  EXTREMITIES:  No edema.    LABORATORY VALUES:  Today's labs, CBC is normal.  Sodium 138, potassium 3.5,   BUN 6, creatinine 0.57, AST 66, , alkaline phosphatase 120, total    bilirubin 0.8.    IMAGING:  MRCP was reviewed with Dr. Jorgensen and shows at least one, maybe 2   small stones in the distal bile duct.    IMPRESSION:  1.  Choledocholithiasis on MRCP.  2.  Gallstones with recent epigastric pain, nausea, vomiting.  3.  Migraine headaches.    PLAN:  ERCP.  I went over the procedure, risks, benefits with her and informed   consent was obtained.  I had explained to her that the best I could do   schedule wise with OR and endoscopy is tomorrow afternoon.  She will be n.p.o.   at midnight.       ____________________________________     MD ASIF ANTONY / BRI    DD:  01/20/2020 09:33:18  DT:  01/20/2020 09:43:33    D#:  [id number]  Job#:  [id number]

## 2020-01-21 ENCOUNTER — PATIENT OUTREACH (OUTPATIENT)
Dept: HEALTH INFORMATION MANAGEMENT | Facility: OTHER | Age: 35
End: 2020-01-21

## 2020-01-21 ENCOUNTER — ANESTHESIA EVENT (OUTPATIENT)
Dept: SURGERY | Facility: MEDICAL CENTER | Age: 35
DRG: 419 | End: 2020-01-21
Payer: COMMERCIAL

## 2020-01-21 ENCOUNTER — ANESTHESIA (OUTPATIENT)
Dept: SURGERY | Facility: MEDICAL CENTER | Age: 35
DRG: 419 | End: 2020-01-21
Payer: COMMERCIAL

## 2020-01-21 ENCOUNTER — APPOINTMENT (OUTPATIENT)
Dept: RADIOLOGY | Facility: MEDICAL CENTER | Age: 35
DRG: 419 | End: 2020-01-21
Attending: INTERNAL MEDICINE
Payer: COMMERCIAL

## 2020-01-21 LAB — HCG UR QL: NEGATIVE

## 2020-01-21 PROCEDURE — 370081 HCHG STENT PANCREATIC PUSHER: Performed by: INTERNAL MEDICINE

## 2020-01-21 PROCEDURE — 160203 HCHG ENDO MINUTES - 1ST 30 MINS LEVEL 4: Performed by: INTERNAL MEDICINE

## 2020-01-21 PROCEDURE — 700117 HCHG RX CONTRAST REV CODE 255: Performed by: INTERNAL MEDICINE

## 2020-01-21 PROCEDURE — 110371 HCHG SHELL REV 272: Performed by: INTERNAL MEDICINE

## 2020-01-21 PROCEDURE — 500066 HCHG BITE BLOCK, ECT: Performed by: INTERNAL MEDICINE

## 2020-01-21 PROCEDURE — 0F7D8DZ DILATION OF PANCREATIC DUCT WITH INTRALUMINAL DEVICE, VIA NATURAL OR ARTIFICIAL OPENING ENDOSCOPIC: ICD-10-PCS | Performed by: INTERNAL MEDICINE

## 2020-01-21 PROCEDURE — 700102 HCHG RX REV CODE 250 W/ 637 OVERRIDE(OP): Performed by: INTERNAL MEDICINE

## 2020-01-21 PROCEDURE — 160002 HCHG RECOVERY MINUTES (STAT): Performed by: INTERNAL MEDICINE

## 2020-01-21 PROCEDURE — BF101ZZ FLUOROSCOPY OF BILE DUCTS USING LOW OSMOLAR CONTRAST: ICD-10-PCS | Performed by: INTERNAL MEDICINE

## 2020-01-21 PROCEDURE — 160035 HCHG PACU - 1ST 60 MINS PHASE I: Performed by: INTERNAL MEDICINE

## 2020-01-21 PROCEDURE — 99232 SBSQ HOSP IP/OBS MODERATE 35: CPT | Performed by: HOSPITALIST

## 2020-01-21 PROCEDURE — 160208 HCHG ENDO MINUTES - EA ADDL 1 MIN LEVEL 4: Performed by: INTERNAL MEDICINE

## 2020-01-21 PROCEDURE — C2617 STENT, NON-COR, TEM W/O DEL: HCPCS | Performed by: INTERNAL MEDICINE

## 2020-01-21 PROCEDURE — 0FC98ZZ EXTIRPATION OF MATTER FROM COMMON BILE DUCT, VIA NATURAL OR ARTIFICIAL OPENING ENDOSCOPIC: ICD-10-PCS | Performed by: INTERNAL MEDICINE

## 2020-01-21 PROCEDURE — 160048 HCHG OR STATISTICAL LEVEL 1-5: Performed by: INTERNAL MEDICINE

## 2020-01-21 PROCEDURE — 160009 HCHG ANES TIME/MIN: Performed by: INTERNAL MEDICINE

## 2020-01-21 PROCEDURE — 700111 HCHG RX REV CODE 636 W/ 250 OVERRIDE (IP): Performed by: HOSPITALIST

## 2020-01-21 PROCEDURE — A9270 NON-COVERED ITEM OR SERVICE: HCPCS | Performed by: INTERNAL MEDICINE

## 2020-01-21 PROCEDURE — 700105 HCHG RX REV CODE 258: Performed by: HOSPITALIST

## 2020-01-21 PROCEDURE — 770006 HCHG ROOM/CARE - MED/SURG/GYN SEMI*

## 2020-01-21 PROCEDURE — 81025 URINE PREGNANCY TEST: CPT

## 2020-01-21 PROCEDURE — 700111 HCHG RX REV CODE 636 W/ 250 OVERRIDE (IP): Performed by: ANESTHESIOLOGY

## 2020-01-21 PROCEDURE — 700111 HCHG RX REV CODE 636 W/ 250 OVERRIDE (IP): Performed by: INTERNAL MEDICINE

## 2020-01-21 PROCEDURE — 700101 HCHG RX REV CODE 250: Performed by: ANESTHESIOLOGY

## 2020-01-21 PROCEDURE — 502240 HCHG MISC OR SUPPLY RC 0272: Performed by: INTERNAL MEDICINE

## 2020-01-21 DEVICE — IMPLANTABLE DEVICE: Type: IMPLANTABLE DEVICE | Status: FUNCTIONAL

## 2020-01-21 RX ORDER — SODIUM CHLORIDE, SODIUM LACTATE, POTASSIUM CHLORIDE, CALCIUM CHLORIDE 600; 310; 30; 20 MG/100ML; MG/100ML; MG/100ML; MG/100ML
INJECTION, SOLUTION INTRAVENOUS CONTINUOUS
Status: DISCONTINUED | OUTPATIENT
Start: 2020-01-21 | End: 2020-01-21 | Stop reason: HOSPADM

## 2020-01-21 RX ORDER — ONDANSETRON 2 MG/ML
4 INJECTION INTRAMUSCULAR; INTRAVENOUS
Status: DISCONTINUED | OUTPATIENT
Start: 2020-01-21 | End: 2020-01-21 | Stop reason: HOSPADM

## 2020-01-21 RX ORDER — SODIUM CHLORIDE, SODIUM LACTATE, POTASSIUM CHLORIDE, CALCIUM CHLORIDE 600; 310; 30; 20 MG/100ML; MG/100ML; MG/100ML; MG/100ML
2000 INJECTION, SOLUTION INTRAVENOUS ONCE
Status: DISCONTINUED | OUTPATIENT
Start: 2020-01-21 | End: 2020-01-21 | Stop reason: HOSPADM

## 2020-01-21 RX ORDER — DIPHENHYDRAMINE HYDROCHLORIDE 50 MG/ML
12.5 INJECTION INTRAMUSCULAR; INTRAVENOUS
Status: DISCONTINUED | OUTPATIENT
Start: 2020-01-21 | End: 2020-01-21 | Stop reason: HOSPADM

## 2020-01-21 RX ORDER — DEXAMETHASONE SODIUM PHOSPHATE 4 MG/ML
INJECTION, SOLUTION INTRA-ARTICULAR; INTRALESIONAL; INTRAMUSCULAR; INTRAVENOUS; SOFT TISSUE PRN
Status: DISCONTINUED | OUTPATIENT
Start: 2020-01-21 | End: 2020-01-21 | Stop reason: SURG

## 2020-01-21 RX ORDER — INDOMETHACIN 50 MG/1
100 SUPPOSITORY RECTAL ONCE
Status: DISCONTINUED | OUTPATIENT
Start: 2020-01-21 | End: 2020-01-21 | Stop reason: HOSPADM

## 2020-01-21 RX ORDER — MEPERIDINE HYDROCHLORIDE 25 MG/ML
6.25 INJECTION INTRAMUSCULAR; INTRAVENOUS; SUBCUTANEOUS
Status: DISCONTINUED | OUTPATIENT
Start: 2020-01-21 | End: 2020-01-21 | Stop reason: HOSPADM

## 2020-01-21 RX ORDER — LIDOCAINE HYDROCHLORIDE 20 MG/ML
INJECTION, SOLUTION EPIDURAL; INFILTRATION; INTRACAUDAL; PERINEURAL PRN
Status: DISCONTINUED | OUTPATIENT
Start: 2020-01-21 | End: 2020-01-21 | Stop reason: SURG

## 2020-01-21 RX ORDER — OXYCODONE HCL 5 MG/5 ML
5 SOLUTION, ORAL ORAL
Status: DISCONTINUED | OUTPATIENT
Start: 2020-01-21 | End: 2020-01-21 | Stop reason: HOSPADM

## 2020-01-21 RX ORDER — ROCURONIUM BROMIDE 10 MG/ML
INJECTION, SOLUTION INTRAVENOUS PRN
Status: DISCONTINUED | OUTPATIENT
Start: 2020-01-21 | End: 2020-01-21 | Stop reason: SURG

## 2020-01-21 RX ORDER — KETOROLAC TROMETHAMINE 30 MG/ML
15 INJECTION, SOLUTION INTRAMUSCULAR; INTRAVENOUS ONCE
Status: COMPLETED | OUTPATIENT
Start: 2020-01-21 | End: 2020-01-21

## 2020-01-21 RX ORDER — CEFAZOLIN SODIUM 1 G/3ML
INJECTION, POWDER, FOR SOLUTION INTRAMUSCULAR; INTRAVENOUS PRN
Status: DISCONTINUED | OUTPATIENT
Start: 2020-01-21 | End: 2020-01-21 | Stop reason: SURG

## 2020-01-21 RX ORDER — HALOPERIDOL 5 MG/ML
1 INJECTION INTRAMUSCULAR
Status: DISCONTINUED | OUTPATIENT
Start: 2020-01-21 | End: 2020-01-21 | Stop reason: HOSPADM

## 2020-01-21 RX ORDER — OXYCODONE HCL 5 MG/5 ML
10 SOLUTION, ORAL ORAL
Status: DISCONTINUED | OUTPATIENT
Start: 2020-01-21 | End: 2020-01-21 | Stop reason: HOSPADM

## 2020-01-21 RX ORDER — INDOMETHACIN 50 MG/1
100 SUPPOSITORY RECTAL ONCE
Status: COMPLETED | OUTPATIENT
Start: 2020-01-21 | End: 2020-01-21

## 2020-01-21 RX ADMIN — SODIUM CHLORIDE: 9 INJECTION, SOLUTION INTRAVENOUS at 15:31

## 2020-01-21 RX ADMIN — CEFAZOLIN 2 G: 330 INJECTION, POWDER, FOR SOLUTION INTRAMUSCULAR; INTRAVENOUS at 15:31

## 2020-01-21 RX ADMIN — DEXAMETHASONE SODIUM PHOSPHATE 4 MG: 4 INJECTION, SOLUTION INTRA-ARTICULAR; INTRALESIONAL; INTRAMUSCULAR; INTRAVENOUS; SOFT TISSUE at 15:34

## 2020-01-21 RX ADMIN — LIDOCAINE HYDROCHLORIDE 100 MG: 20 INJECTION, SOLUTION EPIDURAL; INFILTRATION; INTRACAUDAL at 15:34

## 2020-01-21 RX ADMIN — SODIUM CHLORIDE: 9 INJECTION, SOLUTION INTRAVENOUS at 06:08

## 2020-01-21 RX ADMIN — FENTANYL CITRATE 100 MCG: 50 INJECTION, SOLUTION INTRAMUSCULAR; INTRAVENOUS at 15:33

## 2020-01-21 RX ADMIN — KETOROLAC TROMETHAMINE 15 MG: 30 INJECTION, SOLUTION INTRAMUSCULAR at 16:54

## 2020-01-21 RX ADMIN — ONDANSETRON 4 MG: 2 INJECTION INTRAMUSCULAR; INTRAVENOUS at 15:34

## 2020-01-21 RX ADMIN — SUGAMMADEX 200 MG: 100 INJECTION, SOLUTION INTRAVENOUS at 16:23

## 2020-01-21 RX ADMIN — INDOMETHACIN 100 MG: 50 SUPPOSITORY RECTAL at 16:57

## 2020-01-21 RX ADMIN — PROPOFOL 150 MG: 10 INJECTION, EMULSION INTRAVENOUS at 15:34

## 2020-01-21 RX ADMIN — ROCURONIUM BROMIDE 50 MG: 10 INJECTION, SOLUTION INTRAVENOUS at 15:34

## 2020-01-21 ASSESSMENT — ENCOUNTER SYMPTOMS
NECK PAIN: 0
PALPITATIONS: 0
MYALGIAS: 0
FEVER: 0
ORTHOPNEA: 0
HEADACHES: 0
SINUS PAIN: 0
HEMOPTYSIS: 0
DEPRESSION: 0
BRUISES/BLEEDS EASILY: 0
SHORTNESS OF BREATH: 0
DIZZINESS: 0
COUGH: 0
TINGLING: 0
BLURRED VISION: 0
ABDOMINAL PAIN: 1
NAUSEA: 1

## 2020-01-21 ASSESSMENT — PAIN SCALES - GENERAL: PAIN_LEVEL: 2

## 2020-01-21 ASSESSMENT — PATIENT HEALTH QUESTIONNAIRE - PHQ9
SUM OF ALL RESPONSES TO PHQ9 QUESTIONS 1 AND 2: 0
1. LITTLE INTEREST OR PLEASURE IN DOING THINGS: NOT AT ALL
2. FEELING DOWN, DEPRESSED, IRRITABLE, OR HOPELESS: NOT AT ALL

## 2020-01-21 NOTE — PROGRESS NOTES
"Late entry 1400 - Patient asking if outpatient surgery with Dr. French will be \"for sure\" on Wednesday. Call placed to Dr. French's surgery scheduler. Per surgery scheduler, patient can be scheduled for an appointment on Wednesday, but this will not be the surgery appointment. Surgery scheduler stated the surgery would be scheduled for a later time after the appointment on Wednesday. Discussed this information with patient and significant other. Patient and significant other became upset, stating they believed surgery would occur Wednesday if patient discharged today. Patient stating she wishes to stay in the hospital to \"get everything done at once.\" Page placed to hospitalist-Dr. Peguero. Dr. Peguero came to bedside to speak with patient and significant other. Plan for patient to remain in hospital. ERCP planned for tomorrow and surgery with Dr. French planned for Wednesday.  "

## 2020-01-21 NOTE — OP REPORT
Date of Procedure: 1/21/2020   3:15 PM      Referring Physicians:   1.   Hien French MD    Procedure performed:  1.ERCP  with sphincterotomy  2.ERCP with removal of biliary sludge  3.ERCP with pancreatic duct stent placement     ====================================  Anesthesiologist: Alex Jacome M.D.  Surgeon: Julio Cesar King M.D.    -----------------------------------------------------------------  Preoperative diagnosis:   1. Abnormal Imaging of the abdomen   2. Elevated LFTs     Postoperative diagnosis:  1. Normal biliary tree   ------------------------------------------------------------------    Impression:    The ampulla appear slightly inflamed   There is a long intraduodenal segment     Biliary cholangiogram:  The intrahepatic ducts are not dilated   The left and right main hepatic ducts are not dilated   The common hepatic duct measures 6 mm in size   GB and cystic fills   The CBD measures 7 mm in size   No filling defects seen on cholangiogram   -S/p removal of small amount of sludge   -S/p placement of a 3F x 4 cm plastic pancreatic duct stent with no internal phalanges but with external phalanges    Recommendations:    1. IVF : RL @ 250ml/hr up to 2L   2.Indometahcin 100 mg  P/R in the post op   3.Xaray abdomen single view to evaluate the PD stent position in 1 week   4.Clear liquid diet today       ====================================  Indication:     34 yrs old F presents with RUQ abd pain with elevated LFTs   MRCP : 1/19/20     Liver: Normal hepatic contour. No mass. Minimal intrahepatic biliary dilatation.     Gallbladder: Borderline thickened gallbladder wall. Cholelithiasis.     Common bile duct: Choledocholithiasis, with one or two 2-3 mm gallstones in the distal CBD. CBD measures 7.3 mm.     -------------------------    Medications:  The patient was performed under general anesthesia with monitored anesthesia care.  Propofol sedation was administered under discretion of anesthesiology.   Please refer to their notes for more details.     Universal Precautions:  [place] Precautions were followed throughout the entire procedure.  The patient was continuously monitored on Blood pressure, pulse oximetry, and heart rate monitoring though out the procedure and afterwards. A time out was taken prior to the start of the procedure to identify the patient's name, date of birth, ID badge, and appropriate procedure site/procedure type.    Informed Consent:  Informed consent was obtained from the patient after the risks, benefits, and indications were explained to the patient in detail.  The alternatives to the procedure were explained as well.  Risks such as bleeding, infection, perforation, complications with sedation, and need for open surgery for complications were explained in detail prior to obtaining consent.  The patient was given opportunity to ask questions.  If need be these same risks, benefits, indication, and complications were explained to the patients family member prior to obtaining consent for the procedure.    Description of procedure:    Patient was brought to ERCP fluoro/operating room and met by staff and physicians.  After procedure was confirmed and appropriate procedure was identified preparations were made for sedation.  A time out was taken to identify the patients name, procedure site/type, and ID badge.    The patient was then sedated per anesthesiology and intubated.  The patient was then placed on the fluoroscopy table on their abdomen.    An oral bite block was placed between the incisors.  When the patient was appropriately sedated the GIF Olympus side viewing Duodenal/ERCP endoscope was used to perform the procedure.    The Side viewing Duodenal/ERCP endoscope was passed through the oropharynx to the second duodenum and positioned to the ampulla.    Esophagus:    There were limited views of the esophagus using side viewing endoscope - these were normal in appearance.      Stomach:    Careful examination was made of the body and antrum of the stomach -using side viewing endoscope - these were normal in appearance.    Retroflexed views and photographs were obtained of the Fundus and cardia as well using side viewing endoscope - these were normal in appearance.   Duodenum:  The endoscope was passed through the pylorus into the duodenal bulb which was examined using side viewing endoscope - this was normal in appearance.    Further advancement allowed for examination of the 1st and 2nd portion of the duodenum.    A  film was captured    Views of the Ampulla were obtained and endoscopic photographs were captured.  The AMPULLA was normal in appearance.  The ampulla appear slightly inflamed     Selective cannulation of the biliary system was performed using a balloon Retrieval catheter.  We were able to access the biliary system on  cannulation. Contrast was injected  A guidewire was advanced in to the biliary system. A .025 wire was used.  ---  Pancreatogram:   obtained. Pancreatic system unintentionally cannulated   ---  Biliary cholangiogram:  The intrahepatic ducts are not dilated   The left and right main hepatic ducts are not dilated   The common hepatic duct measures 6 mm in size   GB and cystic fills   The CBD measures 7 mm in size   No filling defects seen on cholangiogram   Multiple fluoroscopic pictures were obtained to evaluate the bilary system.  After cholangiogram was obtained a guidewire was advanced to the intrahepatic ducts and maintained in position.     A controlled biliary SPHINCTEROTOMY was performed using pure endocut electrocautery with the ERBE electrocautery unit and sphincterotome    ----  The guidewire was maintained in the intrahepatic system.  The sphincterotome was removed.  The retrieval balloon catheter was placed over the guidewire and into the ductal system    A 9-12mm balloon retrieval catheter was advanced to the bifurcation and inflated to 12m.  An occlusion cholangiogram was obtained.  Balloon sweeps were performed of the CBD and the balloon catheter was used to remove stones/sludge from the biliary tree:    Small amount o sludge removed   ---  All sludge and biliary stones were removed from the bile ducts and the biliary system was cleared.   The free flow of contrast seen at the end of the procedure     ---------  Stent: A 3F x 4 cm plastic pancreatic duct stent with no internal phalanges but with external phalanges was placed with the PD under fluroscopic guidance      ------------  The endoscope was moved back to the stomach.  The insufflated air and bile products were suctioned.  The endoscope was slowly withdrawn.  No additional findings.  No acute complications.  The patient was extubated and taken to  PACU for recovery.

## 2020-01-21 NOTE — PROGRESS NOTES
Hospital Medicine Daily Progress Note    Date of Service  1/20/2020    Chief Complaint  34 y.o. female admitted 1/18/2020 with abdominal pain    Hospital Course    This is a 34 y.o. female here with abdominal pain. Ms. Alatorre has a history of migraine headaches that presented to the ER with abdominal pain that had been intermittent over the past few weeks though markedly worse after eating Chinese food. She was found to have elevated LFTs with a normal lipase. Liver ultrasound was negative. She was given IV fluids and IV antiemetics with narcotic pain meds. Today her MRCP is consistent with choledocholithiasis. An ERCP has been scheduled tomorrow at 3:30 by Dr. Miller.        Interval Problem Update  1/20/2020: Patient seen and evaluated in the medical floor.  At first patient and her  wanted her to go home and have this done as an outpatient.  Therefore called Dr. Miller and he pulled strings in his office they called the insurance company and was able to get prior authorization for the ERCP to be done as an outpatient.  Therefore she was discharged and I dictated discharge summary.  I had spoken to Dr. French and she was arranging for the patient be seen in her office either Wednesday or Friday to set up an outpatient cholecystectomy.  After I wrote prescription and did the discharge summary, patient elected to stay therefore the discharge was canceled and she will continue to have the ERCP tomorrow and Dr. French perform the cholecystectomy on Wednesday.    Consultants/Specialty  GI  Surgery    Code Status  full    Disposition  surgical    Review of Systems  Review of Systems   Constitutional: Negative for chills and fever.   Respiratory: Negative for shortness of breath.    Cardiovascular: Negative for chest pain.   Gastrointestinal: Positive for abdominal pain and nausea.   All other systems reviewed and are negative.       Physical Exam  Temp:  [36.3 °C (97.3 °F)-36.6 °C (97.8 °F)] 36.6 °C (97.8  °F)  Pulse:  [47-87] 63  Resp:  [16-18] 18  BP: (107-114)/(66-76) 107/76  SpO2:  [96 %-97 %] 96 %    Physical Exam  Vitals signs and nursing note reviewed.   Constitutional:       Appearance: Normal appearance. She is obese.   Eyes:      General: No scleral icterus.     Conjunctiva/sclera: Conjunctivae normal.   Neck:      Musculoskeletal: Normal range of motion and neck supple.   Cardiovascular:      Rate and Rhythm: Normal rate and regular rhythm.      Heart sounds: No murmur.   Pulmonary:      Effort: Pulmonary effort is normal. No respiratory distress.      Breath sounds: Normal breath sounds.   Abdominal:      General: There is distension.      Comments: Mild right upper quadrant tenderness without rebound   Musculoskeletal:      Right lower leg: No edema.      Left lower leg: No edema.   Skin:     General: Skin is warm and dry.   Neurological:      General: No focal deficit present.      Mental Status: She is alert and oriented to person, place, and time.   Psychiatric:         Mood and Affect: Mood normal.         Behavior: Behavior normal.         Fluids    Intake/Output Summary (Last 24 hours) at 1/20/2020 1611  Last data filed at 1/20/2020 1300  Gross per 24 hour   Intake 1160 ml   Output --   Net 1160 ml       Laboratory  Recent Labs     01/18/20  2223 01/20/20  0107   WBC 7.6 4.9   RBC 4.99 4.36   HEMOGLOBIN 14.8 12.8   HEMATOCRIT 42.9 39.3   MCV 86.0 90.1   MCH 29.7 29.4   MCHC 34.5 32.6*   RDW 38.1 40.7   PLATELETCT 290 219   MPV 9.5 9.5     Recent Labs     01/18/20  2223 01/20/20  0107   SODIUM 137 138   POTASSIUM 3.5* 3.5*   CHLORIDE 105 108   CO2 23 23   GLUCOSE 101* 81   BUN 8 6*   CREATININE 0.58 0.57   CALCIUM 9.0 8.5     Recent Labs     01/19/20  0757   INR 1.26*               Imaging  [id number]   Final Result      1. Choledocholithiasis with mild extrahepatic biliary dilatation.   2. Cholelithiasis. No cholecystitis.         US-RUQ   Final Result      Cholelithiasis. No cholecystitis.       DX-CHEST-PORTABLE (1 VIEW)   Final Result      No acute cardiopulmonary abnormality.           Assessment/Plan  * Choledocholithiasis- (present on admission)  Assessment & Plan  MRCP confirms choledocholithiasis  She will be n.p.o. at midnight for an ERCP on 1/21/2020  Has been receiving IV and oral pain medications  IV fluids and IV antiemetics  On 1/22/2020, she will undergo elective cholecystectomy by Dr. French  LFTs remain high though lipase is normal    Obesity  Assessment & Plan  Encouraged weight loss    Epigastric abdominal pain  Assessment & Plan  Consistent with biliary colic          VTE prophylaxis: SCDs

## 2020-01-21 NOTE — CARE PLAN
Problem: Knowledge Deficit  Goal: Knowledge of disease process/condition, treatment plan, diagnostic tests, and medications will improve  Outcome: PROGRESSING AS EXPECTED  Note:   Plan of care discussed with patient and significant other.      Problem: Pain Management  Goal: Pain level will decrease to patient's comfort goal  Outcome: PROGRESSING AS EXPECTED  Note:   PRN pain medications administered for complaints of pain.

## 2020-01-21 NOTE — PROGRESS NOTES
Assumed care at 1845. Pt resting in bed. A&ox 4  Ambulating independently  O2 on RA  Denies pain  Tolerating diet. NPO at midnight  ERCP in AM  Voiding adequately  Last bm 1/18  Call light within reach. Hourly rounding in place

## 2020-01-21 NOTE — CARE PLAN
ERCP today. Cholecystectomy tomorrow morning.     Problem: Fluid Volume:  Goal: Will maintain balanced intake and output  Outcome: PROGRESSING SLOWER THAN EXPECTED   IVF continued. NPO since midnight.     Problem: Communication  Goal: The ability to communicate needs accurately and effectively will improve  Outcome: PROGRESSING AS EXPECTED   No language barrier. Instructions given in Gambian if requested.     Problem: Safety  Goal: Will remain free from injury  Outcome: PROGRESSING AS EXPECTED     Problem: Venous Thromboembolism (VTW)/Deep Vein Thrombosis (DVT) Prevention:  Goal: Patient will participate in Venous Thrombosis (VTE)/Deep Vein Thrombosis (DVT)Prevention Measures  Outcome: PROGRESSING AS EXPECTED   Ambulating in hallway often.   SCDs for post-procedure VTE prevention.     Problem: Bowel/Gastric:  Goal: Normal bowel function is maintained or improved  Outcome: PROGRESSING AS EXPECTED   Passing gas. Normoactive bowel sounds.     Problem: Pain Management  Goal: Pain level will decrease to patient's comfort goal  Outcome: PROGRESSING AS EXPECTED   Denies pain.

## 2020-01-21 NOTE — ANESTHESIA PREPROCEDURE EVALUATION
Relevant Problems   No relevant active problems       Physical Exam    Airway   Mallampati: II  TM distance: >3 FB  Neck ROM: full       Cardiovascular - normal exam  Rhythm: regular  Rate: normal  (-) murmur     Dental - normal exam         Pulmonary - normal exam  Breath sounds clear to auscultation     Abdominal    Neurological - normal exam                 Anesthesia Plan    ASA 2- EMERGENT       Plan - general       Airway plan will be ETT        Induction: intravenous    Postoperative Plan: Postoperative administration of opioids is intended.    Pertinent diagnostic labs and testing reviewed    Informed Consent:    Anesthetic plan and risks discussed with patient.    Use of blood products discussed with: patient whom consented to blood products.

## 2020-01-21 NOTE — CARE PLAN
Problem: Safety  Goal: Will remain free from injury  Outcome: PROGRESSING AS EXPECTED  Goal: Will remain free from falls  Outcome: PROGRESSING AS EXPECTED   Updated about POC. Reinforce call light use. Pt acknowledged understanding    Problem: Pain Management  Goal: Pain level will decrease to patient's comfort goal  Outcome: PROGRESSING AS EXPECTED  Denies pain.  Oxy available if needed

## 2020-01-21 NOTE — ANESTHESIA PROCEDURE NOTES
Airway  Date/Time: 1/21/2020 3:34 PM  Performed by: Alex Jacome M.D.  Authorized by: Alex Jacome M.D.     Location:  OR  Urgency:  Elective  Indications for Airway Management:  Anesthesia  Spontaneous Ventilation: absent    Sedation Level:  Deep  Preoxygenated: Yes    Patient Position:  Sniffing  Final Airway Type:  Endotracheal airway  Final Endotracheal Airway:  ETT  Cuffed: Yes    Technique Used for Successful ETT Placement:  Direct laryngoscopy  Insertion Site:  Oral  Blade Type:  Wing  Laryngoscope Blade/Videolaryngoscope Blade Size:  3  ETT Size (mm):  7.0  Measured from:  Teeth  ETT to Teeth (cm):  21  Placement Verified by: auscultation and capnometry    Cormack-Lehane Classification:  Grade I - full view of glottis  Number of Attempts at Approach:  1

## 2020-01-21 NOTE — OR NURSING
Pre op admission completed. Patient educated on surgical plan of care. All questions answered, bed in low position and call light within reach. Hourly rounding in place

## 2020-01-22 ENCOUNTER — ANESTHESIA EVENT (OUTPATIENT)
Dept: SURGERY | Facility: MEDICAL CENTER | Age: 35
DRG: 419 | End: 2020-01-22
Payer: COMMERCIAL

## 2020-01-22 ENCOUNTER — ANESTHESIA (OUTPATIENT)
Dept: SURGERY | Facility: MEDICAL CENTER | Age: 35
DRG: 419 | End: 2020-01-22
Payer: COMMERCIAL

## 2020-01-22 VITALS
HEIGHT: 61 IN | DIASTOLIC BLOOD PRESSURE: 68 MMHG | SYSTOLIC BLOOD PRESSURE: 106 MMHG | HEART RATE: 59 BPM | WEIGHT: 172.4 LBS | RESPIRATION RATE: 17 BRPM | OXYGEN SATURATION: 95 % | BODY MASS INDEX: 32.55 KG/M2 | TEMPERATURE: 97.7 F

## 2020-01-22 LAB
ALBUMIN SERPL BCP-MCNC: 3.7 G/DL (ref 3.2–4.9)
ALBUMIN/GLOB SERPL: 1.7 G/DL
ALP SERPL-CCNC: 111 U/L (ref 30–99)
ALT SERPL-CCNC: 90 U/L (ref 2–50)
ANION GAP SERPL CALC-SCNC: 11 MMOL/L (ref 0–11.9)
AST SERPL-CCNC: 24 U/L (ref 12–45)
BASOPHILS # BLD AUTO: 0.1 % (ref 0–1.8)
BASOPHILS # BLD: 0.01 K/UL (ref 0–0.12)
BILIRUB SERPL-MCNC: 0.7 MG/DL (ref 0.1–1.5)
BUN SERPL-MCNC: 7 MG/DL (ref 8–22)
CALCIUM SERPL-MCNC: 9 MG/DL (ref 8.5–10.5)
CHLORIDE SERPL-SCNC: 106 MMOL/L (ref 96–112)
CO2 SERPL-SCNC: 21 MMOL/L (ref 20–33)
CREAT SERPL-MCNC: 0.53 MG/DL (ref 0.5–1.4)
EOSINOPHIL # BLD AUTO: 0.01 K/UL (ref 0–0.51)
EOSINOPHIL NFR BLD: 0.1 % (ref 0–6.9)
ERYTHROCYTE [DISTWIDTH] IN BLOOD BY AUTOMATED COUNT: 37.5 FL (ref 35.9–50)
GLOBULIN SER CALC-MCNC: 2.2 G/DL (ref 1.9–3.5)
GLUCOSE SERPL-MCNC: 94 MG/DL (ref 65–99)
HCT VFR BLD AUTO: 38.8 % (ref 37–47)
HGB BLD-MCNC: 13 G/DL (ref 12–16)
IMM GRANULOCYTES # BLD AUTO: 0.02 K/UL (ref 0–0.11)
IMM GRANULOCYTES NFR BLD AUTO: 0.3 % (ref 0–0.9)
LYMPHOCYTES # BLD AUTO: 2 K/UL (ref 1–4.8)
LYMPHOCYTES NFR BLD: 29.8 % (ref 22–41)
MCH RBC QN AUTO: 29.3 PG (ref 27–33)
MCHC RBC AUTO-ENTMCNC: 33.5 G/DL (ref 33.6–35)
MCV RBC AUTO: 87.4 FL (ref 81.4–97.8)
MONOCYTES # BLD AUTO: 0.33 K/UL (ref 0–0.85)
MONOCYTES NFR BLD AUTO: 4.9 % (ref 0–13.4)
NEUTROPHILS # BLD AUTO: 4.35 K/UL (ref 2–7.15)
NEUTROPHILS NFR BLD: 64.8 % (ref 44–72)
NRBC # BLD AUTO: 0 K/UL
NRBC BLD-RTO: 0 /100 WBC
PATHOLOGY CONSULT NOTE: NORMAL
PLATELET # BLD AUTO: 257 K/UL (ref 164–446)
PMV BLD AUTO: 9.5 FL (ref 9–12.9)
POTASSIUM SERPL-SCNC: 3.6 MMOL/L (ref 3.6–5.5)
PROT SERPL-MCNC: 5.9 G/DL (ref 6–8.2)
RBC # BLD AUTO: 4.44 M/UL (ref 4.2–5.4)
SODIUM SERPL-SCNC: 138 MMOL/L (ref 135–145)
WBC # BLD AUTO: 6.7 K/UL (ref 4.8–10.8)

## 2020-01-22 PROCEDURE — 99239 HOSP IP/OBS DSCHRG MGMT >30: CPT | Performed by: HOSPITALIST

## 2020-01-22 PROCEDURE — 160028 HCHG SURGERY MINUTES - 1ST 30 MINS LEVEL 3: Performed by: SURGERY

## 2020-01-22 PROCEDURE — 501586 HCHG TROCAR, THRD SPIKE 5X55: Performed by: SURGERY

## 2020-01-22 PROCEDURE — 500514 HCHG ENDOCLIP: Performed by: SURGERY

## 2020-01-22 PROCEDURE — 88304 TISSUE EXAM BY PATHOLOGIST: CPT

## 2020-01-22 PROCEDURE — 700105 HCHG RX REV CODE 258: Performed by: HOSPITALIST

## 2020-01-22 PROCEDURE — 501574 HCHG TROCAR, SMTH CAN&SEAL 5: Performed by: SURGERY

## 2020-01-22 PROCEDURE — 700102 HCHG RX REV CODE 250 W/ 637 OVERRIDE(OP): Performed by: ANESTHESIOLOGY

## 2020-01-22 PROCEDURE — 160009 HCHG ANES TIME/MIN: Performed by: SURGERY

## 2020-01-22 PROCEDURE — 501838 HCHG SUTURE GENERAL: Performed by: SURGERY

## 2020-01-22 PROCEDURE — 160036 HCHG PACU - EA ADDL 30 MINS PHASE I: Performed by: SURGERY

## 2020-01-22 PROCEDURE — 160002 HCHG RECOVERY MINUTES (STAT): Performed by: SURGERY

## 2020-01-22 PROCEDURE — 160048 HCHG OR STATISTICAL LEVEL 1-5: Performed by: SURGERY

## 2020-01-22 PROCEDURE — A6402 STERILE GAUZE <= 16 SQ IN: HCPCS | Performed by: SURGERY

## 2020-01-22 PROCEDURE — 502571 HCHG PACK, LAP CHOLE: Performed by: SURGERY

## 2020-01-22 PROCEDURE — 700105 HCHG RX REV CODE 258: Performed by: ANESTHESIOLOGY

## 2020-01-22 PROCEDURE — 80053 COMPREHEN METABOLIC PANEL: CPT

## 2020-01-22 PROCEDURE — 700111 HCHG RX REV CODE 636 W/ 250 OVERRIDE (IP): Performed by: SURGERY

## 2020-01-22 PROCEDURE — 500868 HCHG NEEDLE, SURGI(VARES): Performed by: SURGERY

## 2020-01-22 PROCEDURE — 700101 HCHG RX REV CODE 250: Performed by: ANESTHESIOLOGY

## 2020-01-22 PROCEDURE — 0FT44ZZ RESECTION OF GALLBLADDER, PERCUTANEOUS ENDOSCOPIC APPROACH: ICD-10-PCS | Performed by: SURGERY

## 2020-01-22 PROCEDURE — A9270 NON-COVERED ITEM OR SERVICE: HCPCS | Performed by: ANESTHESIOLOGY

## 2020-01-22 PROCEDURE — 85025 COMPLETE CBC W/AUTO DIFF WBC: CPT

## 2020-01-22 PROCEDURE — 700111 HCHG RX REV CODE 636 W/ 250 OVERRIDE (IP): Performed by: ANESTHESIOLOGY

## 2020-01-22 PROCEDURE — 36415 COLL VENOUS BLD VENIPUNCTURE: CPT

## 2020-01-22 PROCEDURE — 160035 HCHG PACU - 1ST 60 MINS PHASE I: Performed by: SURGERY

## 2020-01-22 PROCEDURE — 160039 HCHG SURGERY MINUTES - EA ADDL 1 MIN LEVEL 3: Performed by: SURGERY

## 2020-01-22 RX ORDER — ONDANSETRON 2 MG/ML
INJECTION INTRAMUSCULAR; INTRAVENOUS PRN
Status: DISCONTINUED | OUTPATIENT
Start: 2020-01-22 | End: 2020-01-22 | Stop reason: SURG

## 2020-01-22 RX ORDER — BUPIVACAINE HYDROCHLORIDE 2.5 MG/ML
INJECTION, SOLUTION EPIDURAL; INFILTRATION; INTRACAUDAL
Status: DISCONTINUED | OUTPATIENT
Start: 2020-01-22 | End: 2020-01-22 | Stop reason: HOSPADM

## 2020-01-22 RX ORDER — SODIUM CHLORIDE, SODIUM LACTATE, POTASSIUM CHLORIDE, CALCIUM CHLORIDE 600; 310; 30; 20 MG/100ML; MG/100ML; MG/100ML; MG/100ML
INJECTION, SOLUTION INTRAVENOUS
Status: DISCONTINUED | OUTPATIENT
Start: 2020-01-22 | End: 2020-01-22 | Stop reason: SURG

## 2020-01-22 RX ORDER — OXYCODONE HCL 5 MG/5 ML
10 SOLUTION, ORAL ORAL
Status: COMPLETED | OUTPATIENT
Start: 2020-01-22 | End: 2020-01-22

## 2020-01-22 RX ORDER — HYDRALAZINE HYDROCHLORIDE 20 MG/ML
5 INJECTION INTRAMUSCULAR; INTRAVENOUS
Status: DISCONTINUED | OUTPATIENT
Start: 2020-01-22 | End: 2020-01-22 | Stop reason: HOSPADM

## 2020-01-22 RX ORDER — LIDOCAINE HYDROCHLORIDE 20 MG/ML
INJECTION, SOLUTION EPIDURAL; INFILTRATION; INTRACAUDAL; PERINEURAL PRN
Status: DISCONTINUED | OUTPATIENT
Start: 2020-01-22 | End: 2020-01-22 | Stop reason: SURG

## 2020-01-22 RX ORDER — MEPERIDINE HYDROCHLORIDE 25 MG/ML
6.25 INJECTION INTRAMUSCULAR; INTRAVENOUS; SUBCUTANEOUS
Status: DISCONTINUED | OUTPATIENT
Start: 2020-01-22 | End: 2020-01-22 | Stop reason: HOSPADM

## 2020-01-22 RX ORDER — LABETALOL HYDROCHLORIDE 5 MG/ML
5 INJECTION, SOLUTION INTRAVENOUS
Status: DISCONTINUED | OUTPATIENT
Start: 2020-01-22 | End: 2020-01-22 | Stop reason: HOSPADM

## 2020-01-22 RX ORDER — MIDAZOLAM HYDROCHLORIDE 1 MG/ML
INJECTION INTRAMUSCULAR; INTRAVENOUS PRN
Status: DISCONTINUED | OUTPATIENT
Start: 2020-01-22 | End: 2020-01-22 | Stop reason: SURG

## 2020-01-22 RX ORDER — DEXAMETHASONE SODIUM PHOSPHATE 4 MG/ML
INJECTION, SOLUTION INTRA-ARTICULAR; INTRALESIONAL; INTRAMUSCULAR; INTRAVENOUS; SOFT TISSUE PRN
Status: DISCONTINUED | OUTPATIENT
Start: 2020-01-22 | End: 2020-01-22 | Stop reason: SURG

## 2020-01-22 RX ORDER — CEFAZOLIN SODIUM 1 G/3ML
INJECTION, POWDER, FOR SOLUTION INTRAMUSCULAR; INTRAVENOUS PRN
Status: DISCONTINUED | OUTPATIENT
Start: 2020-01-22 | End: 2020-01-22 | Stop reason: SURG

## 2020-01-22 RX ORDER — HYDROMORPHONE HYDROCHLORIDE 1 MG/ML
0.2 INJECTION, SOLUTION INTRAMUSCULAR; INTRAVENOUS; SUBCUTANEOUS
Status: DISCONTINUED | OUTPATIENT
Start: 2020-01-22 | End: 2020-01-22 | Stop reason: HOSPADM

## 2020-01-22 RX ORDER — ROCURONIUM BROMIDE 10 MG/ML
INJECTION, SOLUTION INTRAVENOUS PRN
Status: DISCONTINUED | OUTPATIENT
Start: 2020-01-22 | End: 2020-01-22 | Stop reason: SURG

## 2020-01-22 RX ORDER — HALOPERIDOL 5 MG/ML
1 INJECTION INTRAMUSCULAR
Status: DISCONTINUED | OUTPATIENT
Start: 2020-01-22 | End: 2020-01-22 | Stop reason: HOSPADM

## 2020-01-22 RX ORDER — ONDANSETRON 2 MG/ML
4 INJECTION INTRAMUSCULAR; INTRAVENOUS
Status: DISCONTINUED | OUTPATIENT
Start: 2020-01-22 | End: 2020-01-22 | Stop reason: HOSPADM

## 2020-01-22 RX ORDER — SODIUM CHLORIDE, SODIUM LACTATE, POTASSIUM CHLORIDE, CALCIUM CHLORIDE 600; 310; 30; 20 MG/100ML; MG/100ML; MG/100ML; MG/100ML
INJECTION, SOLUTION INTRAVENOUS CONTINUOUS
Status: DISCONTINUED | OUTPATIENT
Start: 2020-01-22 | End: 2020-01-22 | Stop reason: HOSPADM

## 2020-01-22 RX ORDER — DIPHENHYDRAMINE HYDROCHLORIDE 50 MG/ML
12.5 INJECTION INTRAMUSCULAR; INTRAVENOUS
Status: DISCONTINUED | OUTPATIENT
Start: 2020-01-22 | End: 2020-01-22 | Stop reason: HOSPADM

## 2020-01-22 RX ORDER — HYDROMORPHONE HYDROCHLORIDE 1 MG/ML
0.1 INJECTION, SOLUTION INTRAMUSCULAR; INTRAVENOUS; SUBCUTANEOUS
Status: DISCONTINUED | OUTPATIENT
Start: 2020-01-22 | End: 2020-01-22 | Stop reason: HOSPADM

## 2020-01-22 RX ORDER — HYDROMORPHONE HYDROCHLORIDE 1 MG/ML
0.4 INJECTION, SOLUTION INTRAMUSCULAR; INTRAVENOUS; SUBCUTANEOUS
Status: DISCONTINUED | OUTPATIENT
Start: 2020-01-22 | End: 2020-01-22 | Stop reason: HOSPADM

## 2020-01-22 RX ORDER — OXYCODONE HCL 5 MG/5 ML
5 SOLUTION, ORAL ORAL
Status: COMPLETED | OUTPATIENT
Start: 2020-01-22 | End: 2020-01-22

## 2020-01-22 RX ADMIN — MIDAZOLAM HYDROCHLORIDE 2 MG: 1 INJECTION, SOLUTION INTRAMUSCULAR; INTRAVENOUS at 08:17

## 2020-01-22 RX ADMIN — FENTANYL CITRATE 50 MCG: 50 INJECTION, SOLUTION INTRAMUSCULAR; INTRAVENOUS at 08:25

## 2020-01-22 RX ADMIN — LIDOCAINE HYDROCHLORIDE 50 MG: 20 INJECTION, SOLUTION EPIDURAL; INFILTRATION; INTRACAUDAL at 08:23

## 2020-01-22 RX ADMIN — OXYCODONE HYDROCHLORIDE 5 MG: 5 SOLUTION ORAL at 09:58

## 2020-01-22 RX ADMIN — FENTANYL CITRATE 50 MCG: 50 INJECTION, SOLUTION INTRAMUSCULAR; INTRAVENOUS at 08:42

## 2020-01-22 RX ADMIN — ROCURONIUM BROMIDE 50 MG: 10 INJECTION, SOLUTION INTRAVENOUS at 08:23

## 2020-01-22 RX ADMIN — FENTANYL CITRATE 50 MCG: 50 INJECTION, SOLUTION INTRAMUSCULAR; INTRAVENOUS at 08:46

## 2020-01-22 RX ADMIN — CEFAZOLIN 2 G: 330 INJECTION, POWDER, FOR SOLUTION INTRAMUSCULAR; INTRAVENOUS at 08:25

## 2020-01-22 RX ADMIN — ONDANSETRON 4 MG: 2 INJECTION INTRAMUSCULAR; INTRAVENOUS at 08:25

## 2020-01-22 RX ADMIN — SODIUM CHLORIDE, POTASSIUM CHLORIDE, SODIUM LACTATE AND CALCIUM CHLORIDE: 600; 310; 30; 20 INJECTION, SOLUTION INTRAVENOUS at 08:17

## 2020-01-22 RX ADMIN — GLYCOPYRROLATE 0.3 MG: 0.2 INJECTION INTRAMUSCULAR; INTRAVENOUS at 08:44

## 2020-01-22 RX ADMIN — FENTANYL CITRATE 50 MCG: 50 INJECTION, SOLUTION INTRAMUSCULAR; INTRAVENOUS at 08:34

## 2020-01-22 RX ADMIN — PROPOFOL 150 MG: 10 INJECTION, EMULSION INTRAVENOUS at 08:23

## 2020-01-22 RX ADMIN — SODIUM CHLORIDE: 9 INJECTION, SOLUTION INTRAVENOUS at 01:33

## 2020-01-22 RX ADMIN — SUGAMMADEX 200 MG: 100 INJECTION, SOLUTION INTRAVENOUS at 08:40

## 2020-01-22 RX ADMIN — DEXAMETHASONE SODIUM PHOSPHATE 8 MG: 4 INJECTION, SOLUTION INTRA-ARTICULAR; INTRALESIONAL; INTRAMUSCULAR; INTRAVENOUS; SOFT TISSUE at 08:25

## 2020-01-22 ASSESSMENT — PAIN SCALES - GENERAL: PAIN_LEVEL: 0

## 2020-01-22 NOTE — PROGRESS NOTES
Bedside report received.  Assessment complete.  A&O x 4. Patient calls appropriately.  Patient ambulates with no assist.   Patient has 0/10 pain. Pain managed with prescribed medications.  Denies N&V. NPO since midnight for procedure.  + void, + flatus, last BM PTA.  Patient denies SOB.  SCD's on.  Review plan with of care with patient. Call light and personal belongings with in reach. Hourly rounding in place. All needs met at this time.    Patient anticipating discharge after surgery today.

## 2020-01-22 NOTE — ANESTHESIA POSTPROCEDURE EVALUATION
Patient: Mattie Alatorre    Procedure Summary     Date:  01/22/20 Room / Location:  [facility] OR 09 / SURGERY [facility]    Anesthesia Start:  0817 Anesthesia Stop:  0852    Procedure:  CHOLECYSTECTOMY, LAPAROSCOPIC (N/A Abdomen) Diagnosis:  (cholelithiasis, choledolcolithiasis)    Surgeon:  Hien French M.D. Responsible Provider:  Galileo Malin M.D.    Anesthesia Type:  general ASA Status:  2          Final Anesthesia Type: general  Last vitals  BP   Blood Pressure: 142/85    Temp   36.6 °C (97.9 °F)    Pulse   Pulse: 84   Resp   15    SpO2   100 %      Anesthesia Post Evaluation    Patient location during evaluation: PACU  Patient participation: complete - patient participated  Level of consciousness: awake and alert  Pain score: 0    Airway patency: patent  Anesthetic complications: no  Cardiovascular status: hemodynamically stable  Respiratory status: acceptable  Hydration status: euvolemic    PONV: none           Nurse Pain Score: 0 (NPRS)

## 2020-01-22 NOTE — OR NURSING
Pt on room air.  No c/o nausea, tolerating hot tea, pt on clear liquid diet.  Pt denies pain/discomfort at this time.  Indocin administered per MAR.  Pt receiving first liter of two of LR per MAR.  VSS, afebrile, CARREON, A/O x4.    No belongings in PACU.

## 2020-01-22 NOTE — PROGRESS NOTES
Hospital Medicine Daily Progress Note    Date of Service  1/21/2020    Chief Complaint  34 y.o. female admitted 1/18/2020 with abdominal pain    Hospital Course    This is a 34 y.o. female here with abdominal pain. Ms. Alatorre has a history of migraine headaches that presented to the ER with abdominal pain that had been intermittent over the past few weeks though markedly worse after eating Chinese food. She was found to have elevated LFTs with a normal lipase. Liver ultrasound was negative. She was given IV fluids and IV antiemetics with narcotic pain meds. Today her MRCP is consistent with choledocholithiasis. An ERCP has been scheduled tomorrow at 3:30 by Dr. Miller.        Interval Problem Update  Patient resting in bed, no new complaints, going for ERCP today, no fever no chills.    Consultants/Specialty  GI  Surgery    Code Status  full    Disposition  Home when stable    Review of Systems  Review of Systems   Constitutional: Negative for fever.   HENT: Negative for congestion, nosebleeds and sinus pain.    Eyes: Negative for blurred vision.   Respiratory: Negative for cough, hemoptysis and shortness of breath.    Cardiovascular: Negative for chest pain, palpitations and orthopnea.   Gastrointestinal: Positive for abdominal pain and nausea.   Genitourinary: Negative for dysuria and urgency.   Musculoskeletal: Negative for myalgias and neck pain.   Neurological: Negative for dizziness, tingling and headaches.   Endo/Heme/Allergies: Does not bruise/bleed easily.   Psychiatric/Behavioral: Negative for depression and suicidal ideas.        Physical Exam  Temp:  [36.2 °C (97.2 °F)-36.6 °C (97.8 °F)] 36.4 °C (97.6 °F)  Pulse:  [50-71] 50  Resp:  [16-18] 16  BP: (103-121)/(64-72) 121/64  SpO2:  [95 %-97 %] 97 %    Physical Exam  Vitals signs and nursing note reviewed.   Constitutional:       Appearance: Normal appearance. She is obese.   Eyes:      General: No scleral icterus.     Conjunctiva/sclera: Conjunctivae  normal.   Neck:      Musculoskeletal: Normal range of motion and neck supple.   Cardiovascular:      Rate and Rhythm: Normal rate and regular rhythm.      Heart sounds: No murmur.   Pulmonary:      Effort: Pulmonary effort is normal. No respiratory distress.      Breath sounds: Normal breath sounds. No wheezing.   Abdominal:      General: There is distension.      Tenderness: There is no tenderness.      Comments: Mild right upper quadrant tenderness without rebound   Musculoskeletal:      Right lower leg: No edema.      Left lower leg: No edema.   Skin:     General: Skin is warm and dry.   Neurological:      General: No focal deficit present.      Mental Status: She is alert and oriented to person, place, and time.   Psychiatric:         Mood and Affect: Mood normal.         Behavior: Behavior normal.         Fluids    Intake/Output Summary (Last 24 hours) at 1/21/2020 1722  Last data filed at 1/21/2020 1715  Gross per 24 hour   Intake 3075 ml   Output 101 ml   Net 2974 ml       Laboratory  Recent Labs     01/18/20  2223 01/20/20  0107   WBC 7.6 4.9   RBC 4.99 4.36   HEMOGLOBIN 14.8 12.8   HEMATOCRIT 42.9 39.3   MCV 86.0 90.1   MCH 29.7 29.4   MCHC 34.5 32.6*   RDW 38.1 40.7   PLATELETCT 290 219   MPV 9.5 9.5     Recent Labs     01/18/20  2223 01/20/20  0107   SODIUM 137 138   POTASSIUM 3.5* 3.5*   CHLORIDE 105 108   CO2 23 23   GLUCOSE 101* 81   BUN 8 6*   CREATININE 0.58 0.57   CALCIUM 9.0 8.5     Recent Labs     01/19/20  0757   INR 1.26*               Imaging  DX-PORTABLE FLUORO > 1 HOUR   Final Result      Intraoperative fluoroscopic spot images as described above.      [id number]   Final Result      1. Choledocholithiasis with mild extrahepatic biliary dilatation.   2. Cholelithiasis. No cholecystitis.         US-RUQ   Final Result      Cholelithiasis. No cholecystitis.      DX-CHEST-PORTABLE (1 VIEW)   Final Result      No acute cardiopulmonary abnormality.           Assessment/Plan  *  Choledocholithiasis- (present on admission)  Assessment & Plan  MRCP confirms choledocholithiasis  ERCP today, cholecystectomy in a.m.    Obesity  Assessment & Plan  Encouraged weight loss  Body mass index is 32.57 kg/m².      Epigastric abdominal pain  Assessment & Plan  Consistent with biliary colic   MRCP showed choledocholithiasis, going for ERCP today by GI      Abnormal LFTs  Assessment & Plan  Due to above       VTE prophylaxis: SCDs    Case and plan of care discussed with nurse staff  Case a plan of care discussed during multidisciplinary rounds

## 2020-01-22 NOTE — OR NURSING
Pt's  Manuel phoned.  Voice message left regarding pt status in Recovery and transfer back to room T415-01.

## 2020-01-22 NOTE — PROGRESS NOTES
Bedside report received.  Assessment complete.  A&O x 4. Patient calls appropriately.  Patient up self, steady gait.  Patient has denies pain at this time.  Denies N&V. Tolerating clear liquid diet. NPO at midnight for lap gregory.  + void.  Patient denies SOB.  SCD's in use.    Review plan with of care with patient. Call light and personal belongings with in reach. Hourly rounding in place. All needs met at this time.

## 2020-01-22 NOTE — ANESTHESIA PREPROCEDURE EVALUATION
Relevant Problems   ANESTHESIA (within normal limits)      PULMONARY (within normal limits)      NEURO (within normal limits)      CARDIAC (within normal limits)       (within normal limits)      ENDO (within normal limits)      Other   (+) Abnormal LFTs   (+) Choledocholithiasis   (+) Epigastric abdominal pain   (+) Obesity       Physical Exam    Airway   Mallampati: II  TM distance: >3 FB  Neck ROM: full       Cardiovascular - normal exam  Rhythm: regular  Rate: normal  (-) murmur     Dental - normal exam         Pulmonary - normal exam  Breath sounds clear to auscultation     Abdominal    Neurological - normal exam                 Anesthesia Plan    ASA 2       Plan - general       Airway plan will be ETT        Induction: intravenous    Postoperative Plan: Postoperative administration of opioids is intended.    Pertinent diagnostic labs and testing reviewed    Informed Consent:    Anesthetic plan and risks discussed with patient.    Use of blood products discussed with: patient whom consented to blood products.

## 2020-01-22 NOTE — ANESTHESIA PROCEDURE NOTES
Airway  Date/Time: 1/22/2020 8:23 AM  Performed by: Galileo Malin M.D.  Authorized by: Galileo Malin M.D.     Location:  OR  Urgency:  Elective  Indications for Airway Management:  Anesthesia  Spontaneous Ventilation: absent    Sedation Level:  Deep  Preoxygenated: Yes    Patient Position:  Sniffing  Final Airway Type:  Endotracheal airway  Final Endotracheal Airway:  ETT  Cuffed: Yes    Technique Used for Successful ETT Placement:  Direct laryngoscopy  Insertion Site:  Oral  Blade Type:  Wing  Laryngoscope Blade/Videolaryngoscope Blade Size:  3  ETT Size (mm):  7.0  Measured from:  Teeth  ETT to Teeth (cm):  21  Placement Verified by: auscultation and capnometry    Cormack-Lehane Classification:  Grade I - full view of glottis  Number of Attempts at Approach:  1

## 2020-01-22 NOTE — CARE PLAN
Problem: Safety  Goal: Will remain free from injury  Outcome: PROGRESSING AS EXPECTED  Note:   Patient free from accidental injury at this time. Safety precautions in place. Hourly rounding in place.      Problem: Pain Management  Goal: Pain level will decrease to patient's comfort goal  Outcome: PROGRESSING AS EXPECTED  Note:   Patient denying pain at this time. Patient encouraged to call if pain worsens.

## 2020-01-22 NOTE — ANESTHESIA POSTPROCEDURE EVALUATION
Patient: Mattie Alatorre    Procedure Summary     Date:  01/21/20 Room / Location:  [place] [id number] / SURGERY [facility]    Anesthesia Start:  1531 Anesthesia Stop:  1635    Procedure:  ERCP (ENDOSCOPIC RETROGRADE CHOLANGIOPANCREATOGRAPHY) with sphincterotomy and balloon sweep (N/A Mouth) Diagnosis:  (CALCULUS OF BILE DUCT WITH CHOLECYSTITIS UNSPECIFIED WITH OBSTRUCTION)    Surgeon:  Julio Cesar King M.D. Responsible Provider:  Alex Jacome M.D.    Anesthesia Type:  general ASA Status:  2 - Emergent          Final Anesthesia Type: general  Last vitals  BP   Blood Pressure: 121/64    Temp   36.4 °C (97.6 °F)    Pulse   Pulse: (!) 50   Resp   16    SpO2   97 %      Anesthesia Post Evaluation    Patient location during evaluation: PACU  Patient participation: complete - patient participated  Level of consciousness: awake and alert  Pain score: 2    Airway patency: patent  Anesthetic complications: no  Cardiovascular status: hemodynamically stable  Respiratory status: acceptable  Hydration status: euvolemic    PONV: none           Nurse Pain Score: 0 (NPRS)

## 2020-01-22 NOTE — CARE PLAN
Problem: Safety  Goal: Will remain free from injury  Outcome: PROGRESSING AS EXPECTED   Patient uses call light appropriately. Bed locked and in lowest position. Call light within reach.    Problem: Venous Thromboembolism (VTW)/Deep Vein Thrombosis (DVT) Prevention:  Goal: Patient will participate in Venous Thrombosis (VTE)/Deep Vein Thrombosis (DVT)Prevention Measures  Outcome: PROGRESSING AS EXPECTED   SCD in use.

## 2020-01-22 NOTE — PROGRESS NOTES
Report received from Kelley HELMS. [facility]   Pt is ambulating the halls with steady gait. Tolerating clear liquid diet. Reports pain is under control.   Pt has x4 lap sites with gauze and tegaderm, clean, dry and intake. Vital signs stable.

## 2020-01-22 NOTE — OR NURSING
0901 Pt arrived to PACU with Anesthesiologist and OR RN. AAOx1. VSS. 100% spO2 with OPA in place, 6L NC. Nonverbal descriptors indicate no pain and nausea. 4 abdominal sites; dressings are CDI and soft.    0916 Pt AAOx4, awake, alert, OPA d/c'd. Denies pain and nausea. Surgical sites are CDI.     0931 Criteria met.    0945 Report called to Collette, RN on GSU. Pt will be transported back to [facility]    0959 pt c/o 4/10. PRN oxycodone given.     1005 Pt transported to room with transport,  chart with pt.

## 2020-01-22 NOTE — ANESTHESIA TIME REPORT
Anesthesia Start and Stop Event Times     Date Time Event    1/21/2020 1525 Ready for Procedure     1531 Anesthesia Start     1635 Anesthesia Stop        Responsible Staff  01/21/20    Name Role Begin End    Alex Jacome M.D. Anesth 1531 1635        Preop Diagnosis (Free Text):  Pre-op Diagnosis     CALCULUS OF BILE DUCT WITH CHOLECYSTITIS UNSPECIFIED WITH OBSTRUCTION        Preop Diagnosis (Codes):    Post op Diagnosis  Gall stones, common bile duct      Premium Reason  A. 3PM - 7AM    Comments: premium, emergency, gunner, #[phone]

## 2020-01-22 NOTE — OP REPORT
DATE OF SERVICE:  01/22/2020    PREOPERATIVE DIAGNOSIS:  Choledocholithiasis.    POSTOPERATIVE DIAGNOSIS:  Choledocholithiasis.    PROCEDURE PERFORMED:  Laparoscopic cholecystectomy.    SURGEON:  Hien French MD    ASSISTANT:  MONICA Titus    ANESTHESIA:  General endotracheal.    ANESTHESIOLOGIST:  Galileo Malin MD    INDICATIONS:  The patient is a 34-year-old female who presented with   symptomatic abdominal pain and labs showing a probable choledocholithiasis.    She had an ERCP yesterday.  She is being brought at this time now for   laparoscopic cholecystectomy.    FINDINGS:  Evidence of chronic cholecystitis, single duct, single artery   identified.    DESCRIPTION OF PROCEDURE:  After the patient was identified and consented, she   was brought to the operating room and placed in the supine position.  The   patient underwent general endotracheal anesthetic clearance.  The patient's   abdomen was prepped and draped in sterile fashion.  The periumbilical area was   anesthetized with 0.25% Marcaine and 1-cm incision was made.  The abdominal   wall was lifted up and Veress needle inserted into the abdominal cavity.    After positive drop test, pneumoperitoneum was obtained.  Veress needle was   removed.  A 5 mm trocar was placed.  Under laparoscopic guidance, 5 mm trocar   was placed in the epigastric position and two 5 mm trocars placed in right   subcostal position.  The gallbladder was lifted up, demonstrates triangle of   Calot.  The duct and artery and surrounding tissues were doubly clipped and   transected.  The gallbladder was incised from the liver bed using   electrocautery and was brought through the epigastric port.  Liver bed was   irrigated and hemostasis secured.  Port sites removed and pneumoperitoneum   released.  All ports were closed with interrupted 4-0 Vicryls.  Op-Site   dressing was placed on the wounds.  The patient was extubated and taken to the   recovery room in stable  condition.  All sponge and needle counts were   correct.       ____________________________________     MD NIKO CHESTER / BRI    DD:  01/22/2020 08:43:38  DT:  01/22/2020 08:54:52    D#:  [id number]  Job#:  [id number]    cc: Galileo Malin MD, PRESTON ANDERSON

## 2020-01-22 NOTE — DISCHARGE INSTRUCTIONS
Discharge Instructions per Luigi Corea M.D.    Follow-up with Dr. French as scheduled  Follow-up with GI in 1 week, will need abdominal x-ray in 1 week    DIET: Soft diet advance as tolerated    ACTIVITY: As per general surgery recommendations    DIAGNOSIS: Abdominal pain.  Choledocholithiasis    Return to ER if symptoms return, abdominal pain, nausea, vomiting, fever, chills.                Discharge Instructions    Discharged to home by car with relative. Discharged via wheelchair, hospital escort: Yes.  Special equipment needed: Not Applicable    Be sure to schedule a follow-up appointment with your primary care doctor or any specialists as instructed.     Discharge Plan:   Diet Plan: Discussed  Activity Level: Discussed  Confirmed Follow up Appointment: Patient to Call and Schedule Appointment  Confirmed Symptoms Management: Discussed  Medication Reconciliation Updated: Yes  Influenza Vaccine Indication: Indicated: 9 to 64 years of age    I understand that a diet low in cholesterol, fat, and sodium is recommended for good health. Unless I have been given specific instructions below for another diet, I accept this instruction as my diet prescription.   Other diet: Clear liquid    Special Instructions:     Laparoscopic Cholecystectomy D/C instructions:     DIET: Upon discharge from the hospital you may resume your normal preoperative diet. Depending on how you are feeling and whether you have nausea or not, you may wish to stay with a bland diet for the first few days. However, you can advance this as quickly as you feel ready.     ACTIVITIES: After discharge from the hospital, you may resume full routine activities. However, there should be no heavy lifting (greater than 15 pounds) and no strenuous activities until after your follow-up visit. Otherwise, routine activities of daily living are acceptable.     DRIVING: You may drive whenever you are off pain medications and are able to perform the  activities needed to drive, i.e. turning, bending, twisting, etc.     BATHING: You may get the wound wet at any time after leaving the hospital. You may shower, but do not submerge in a bath for at least a week. Dressings may come off after 48 hours.     BOWEL FUNCTION: A few patients, after this operation, will develop either frequent or loose stools after meals. This usually corrects itself after a few days, to a few weeks. If this occurs, do not worry; it is not unusual and will resolve. Much more common than loose stools, is constipation. The combination of pain medication and decreased activity level can cause constipation in otherwise normal patients. If you feel this is occurring, take a laxative (Milk of Magnesia, Ex-Lax, Senokot, etc.) until the problem has resolved.     PAIN MEDICATION: You will be given a prescription for pain medication at discharge. Please take these as directed. It is important to remember not to take medications on an empty stomach as this may cause nausea.     CALL IF YOU HAVE: (1) Fevers to more than 1010 F, (2) Unusual chest or leg pain, (3) Drainage or fluid from incision that may be foul smelling, increased tenderness or soreness at the wound or the wound edges are no longer together, redness or swelling at the incision site. Please do not hesitate to call with any other questions.     APPOINTMENT: Contact our office at [phone] for a follow-up appointment in 1 to 2 weeks following your procedure.     If you have any additional questions, please do not hesitate to call the office.     Office address:   [address] [place] [address], [address] [place] [address]  · Is patient discharged on Warfarin / Coumadin?   No     Laparoscopic Cholecystectomy, Care After  These instructions give you information on caring for yourself after your procedure. Your doctor may also give you more specific instructions. Call your doctor if you have any problems or questions after your procedure.   HOME  CARE  · Change your bandages (dressings) as told by your doctor.  · Keep the wound dry and clean. Wash the wound gently with soap and water. Pat the wound dry with a clean towel.  · Do not take baths, swim, or use hot tubs for 2 weeks, or as told by your doctor.  · Only take medicine as told by your doctor.  · Eat a normal diet as told by your doctor.  · Do not lift anything heavier than 10 pounds (4.5 kg) until your doctor says it is okay.  · Do not play contact sports for 1 week, or as told by your doctor.  GET HELP IF:  · Your wound is red, puffy (swollen), or painful.  · You have yellowish-white fluid (pus) coming from the wound.  · You have fluid draining from the wound for more than 1 day.  · You have a bad smell coming from the wound.  · Your wound breaks open.  GET HELP RIGHT AWAY IF:   · You have a rash.  · You have trouble breathing.  · You have chest pain.  · You have a fever.  · You have pain in the shoulders (shoulder strap areas) that is getting worse.  · You feel dizzy or pass out (faint).  · You have severe belly (abdominal) pain.  · You feel sick to your stomach (nauseous) or throw up (vomit) for more than 1 day.     This information is not intended to replace advice given to you by your health care provider. Make sure you discuss any questions you have with your health care provider.     Document Released: 09/26/2009 Document Revised: 10/08/2014 Document Reviewed: 07/30/2014  [employer] Interactive Patient Education ©2016 [employer]    Depression / Suicide Risk    As you are discharged from this [facility] facility, it is important to learn how to keep safe from harming yourself.    Recognize the warning signs:  · Abrupt changes in personality, positive or negative- including increase in energy   · Giving away possessions  · Change in eating patterns- significant weight changes-  positive or negative  · Change in sleeping patterns- unable to sleep or sleeping all the time   · Unwillingness or  inability to communicate  · Depression  · Unusual sadness, discouragement and loneliness  · Talk of wanting to die  · Neglect of personal appearance   · Rebelliousness- reckless behavior  · Withdrawal from people/activities they love  · Confusion- inability to concentrate     If you or a loved one observes any of these behaviors or has concerns about self-harm, here's what you can do:  · Talk about it- your feelings and reasons for harming yourself  · Remove any means that you might use to hurt yourself (examples: pills, rope, extension cords, firearm)  · Get professional help from the community (Mental Health, Substance Abuse, psychological counseling)  · Do not be alone:Call your Safe Contact- someone whom you trust who will be there for you.  · Call your local CRISIS HOTLINE 517-6758 or 208-520-1268  · Call your local Children's Mobile Crisis Response Team [place] (634) 582-9842 or www.[employer]  · Call the toll free National Suicide Prevention Hotlines   · National Suicide Prevention Lifeline 773-240-AZEM (6093)  · National Hope Line Network 800-SUICIDE (188-3925)

## 2020-01-22 NOTE — ANESTHESIA QCDR
2019 [facility] Clinical Data Registry (for Quality Improvement)     Postoperative nausea/vomiting risk protocol (Adult = 18 yrs and Pediatric 3-17 yrs)- (430 and 463)  General inhalation anesthetic (NOT TIVA) with PONV risk factors: Yes  Provision of anti-emetic therapy with at least 2 different classes of agents: Yes   Patient DID NOT receive anti-emetic therapy and reason is documented in Medical Record:  N/A    Multimodal Pain Management- (477)  Non-emergent surgery AND patient age >= 18: No  Use of Multimodal Pain Management, two or more drugs and/or interventions, NOT including systemic opioids:   Exception: Documented allergy to multiple classes of analgesics:     Smoking Abstinence (404)  Patient is current smoker (cigarette, pipe, e-cig, marijuanna): No  Elective Surgery:   Abstinence instructions provided prior to day of surgery:   Patient abstained from smoking on day of surgery:     Pre-Op Beta-Blocker in Isolated CABG (44)  Isolated CABG AND patient age >= 18: No  Beta-blocker admin within 24 hours of surgical incision:   Exception:of medical reason(s) for not administering beta blocker within 24 hours prior to surgical incision (e.g., not  indicated,other medical reason):     PACU assessment of acute postoperative pain prior to Anesthesia Care End- Applies to Patients Age = 18- (ABG7)  Initial PACU pain score is which of the following: < 7/10  Patient unable to report pain score: N/A    Post-anesthetic transfer of care checklist/protocol to PACU/ICU- (426 and 427)  Upon conclusion of case, patient transferred to which of the following locations: PACU/Non-ICU  Use of transfer checklist/protocol: No  Exclusion: Service Performed in Patient Hospital Room (and thus did not require transfer):    Unplanned admission to ICU related to anesthesia service up through end of PACU care- (MD51)  Unplanned admission to ICU (not initially anticipated at anesthesia start time): No

## 2020-01-23 NOTE — PROGRESS NOTES
Received discharge orders. Patient is ambulating, tolerating GI soft diet, pain is under control. Education given. PIV removed, patient showered. Pain medication Rx given, education on opioid use. Work note received from MD  Pt escorted off floor, discharging home with .

## 2020-01-23 NOTE — DISCHARGE SUMMARY
Discharge Summary    CHIEF COMPLAINT ON ADMISSION  Chief Complaint   Patient presents with   • Epigastric Pain     Started yesterday at 1930. Reports epigastric pain radiating straight to back. Reports nausea, denies vomiting of diarrhea.        Reason for Admission  Abd pain, back pain     Admission Date  1/18/2020    CODE STATUS  Full Code    HPI & HOSPITAL COURSE  Please see original H&P and consult note for specific information, patient was admitted due to abdominal pain patient had recurrent liver function test had MRCP that showed possible choledocholithiasis, GI was consulted patient went for ERCP that did not show any obstruction, biliary sludge was removed patient had plastic pancreatic stent placement, as per GI patient needs to get a KUB in 1 week and follow-up with GI in 1 week, general surgery was consulted patient went for cholecystectomy today, per surgery patient is cleared for discharge, patient has been ambulating she is tolerating diet she is alert oriented follows commands denies any pain only at the surgical site, patient is afebrile, patient went to be discharged home today she will follow-up with GI and surgery as outpatient as well as with her primary care physician, patient expressed understanding of her discharge plan and agree with it all questions answered      Therefore, she is discharged in good and stable condition to home with close outpatient follow-up.    The patient met 2-midnight criteria for an inpatient stay at the time of discharge.    Discharge Date  1/22/2020    FOLLOW UP ITEMS POST DISCHARGE  Primary care physician  General surgery  GI  KUB in 1 week    DISCHARGE DIAGNOSES  Principal Problem:    Choledocholithiasis POA: Yes  Active Problems:    Abnormal LFTs POA: Unknown    Epigastric abdominal pain POA: Unknown    Obesity POA: Unknown  Resolved Problems:    Hypokalemia POA: Unknown      FOLLOW UP  No future appointments.  Hien French M.D.  [address] [place] [address] [address]  [address]  [place]  [place] [address]  [phone]    Schedule an appointment as soon as possible for a visit in 4 days      JUDY Bill  [address] [facility] [address]  [phone]    Schedule an appointment as soon as possible for a visit in 1 week  [facility]  left voicemail with office to call and schedule you for a hospital follow up appointment. If you do not hear from them in 2 business days please call. Thank you.     Hien French M.D.  [address]  [address] [address]  [phone]    On 1/29/2020      JUDY Bill  [address] [facility] [address]  [phone]    In 1 week      Julio Cesar King M.D.  [address] [facility] [address]  [phone]    In 1 week      Hien French M.D.  [address] [facility] [address]  [address] [address]  [phone]            MEDICATIONS ON DISCHARGE     Medication List      START taking these medications      Instructions   oxyCODONE immediate-release 5 MG Tabs  Commonly known as:  ROXICODONE   Take 1 Tab by mouth every four hours as needed for Severe Pain for up to 15 doses.  Dose:  5 mg            Allergies  No Known Allergies    DIET  Orders Placed This Encounter   Procedures   • Diet Order Low Fiber(GI Soft)     Standing Status:   Standing     Number of Occurrences:   1     Order Specific Question:   Diet:     Answer:   Low Fiber(GI Soft) [2]   • Discontinue Diet Tray     Standing Status:   Standing     Number of Occurrences:   1       ACTIVITY  As tolerated.  Weight bearing as tolerated    CONSULTATIONS  GI  General surgery    PROCEDURES  ERCP  Cholecystectomy    LABORATORY  Lab Results   Component Value Date    SODIUM 138 01/22/2020    POTASSIUM 3.6 01/22/2020    CHLORIDE 106 01/22/2020    CO2 21 01/22/2020    GLUCOSE 94 01/22/2020    BUN 7 (L) 01/22/2020    CREATININE 0.53 01/22/2020        Lab Results   Component Value Date    WBC 6.7 01/22/2020    HEMOGLOBIN 13.0 01/22/2020    HEMATOCRIT 38.8 01/22/2020    PLATELETCT 257 01/22/2020         Total time of the discharge process exceeds 35 minutes.

## 2020-12-11 ENCOUNTER — OFFICE VISIT (OUTPATIENT)
Dept: MEDICAL GROUP | Facility: MEDICAL CENTER | Age: 35
End: 2020-12-11
Payer: COMMERCIAL

## 2020-12-11 VITALS
SYSTOLIC BLOOD PRESSURE: 102 MMHG | DIASTOLIC BLOOD PRESSURE: 70 MMHG | HEART RATE: 77 BPM | TEMPERATURE: 97.3 F | BODY MASS INDEX: 34.38 KG/M2 | HEIGHT: 61 IN | OXYGEN SATURATION: 98 % | WEIGHT: 182.1 LBS

## 2020-12-11 DIAGNOSIS — Z76.89 ENCOUNTER TO ESTABLISH CARE: ICD-10-CM

## 2020-12-11 DIAGNOSIS — B37.2 YEAST DERMATITIS: ICD-10-CM

## 2020-12-11 DIAGNOSIS — N64.59 ABNORMAL BREAST EXAM: ICD-10-CM

## 2020-12-11 DIAGNOSIS — M21.611 BUNION OF RIGHT FOOT: ICD-10-CM

## 2020-12-11 DIAGNOSIS — E66.9 OBESITY (BMI 30-39.9): ICD-10-CM

## 2020-12-11 PROBLEM — R10.13 EPIGASTRIC ABDOMINAL PAIN: Status: RESOLVED | Noted: 2020-01-19 | Resolved: 2020-12-11

## 2020-12-11 PROCEDURE — 99214 OFFICE O/P EST MOD 30 MIN: CPT | Performed by: NURSE PRACTITIONER

## 2020-12-11 RX ORDER — CLOTRIMAZOLE AND BETAMETHASONE DIPROPIONATE 10; .64 MG/G; MG/G
1 CREAM TOPICAL 2 TIMES DAILY
Qty: 15 G | Refills: 0 | Status: SHIPPED | OUTPATIENT
Start: 2020-12-11 | End: 2020-12-18

## 2020-12-11 ASSESSMENT — FIBROSIS 4 INDEX: FIB4 SCORE: 0.34

## 2020-12-11 ASSESSMENT — PATIENT HEALTH QUESTIONNAIRE - PHQ9: CLINICAL INTERPRETATION OF PHQ2 SCORE: 0

## 2020-12-11 NOTE — LETTER
Formerly [facility]  KRYSTLE PeaceRSETH.  [address] [facility] [address]  [facility] [address]  Fax: [phone]   Authorization for Release/Disclosure of   Protected Health Information   Name: JEREMÍAS GERMAN : 1985 SSN: [id number]   Address: [address] [place] [address] Phone:    [phone] (home) [phone] (work)   I authorize the entity listed below to release/disclose the PHI below to:   [facility]/SARA PeaceP.RJANNY and MARY ELLEN Peace   Provider or Entity Name:                                                  Dr. Florencia Lanier - [facility]     Address   City, [facility] [place] [address]   Phone:      Fax:     Reason for request: continuity of care   Information to be released:    [  ] LAST COLONOSCOPY,  including any PATH REPORT and follow-up  [  ] LAST FIT/COLOGUARD RESULT [  ] LAST DEXA  [  ] LAST MAMMOGRAM  [  ] LAST PAP  [  ] LAST LABS [  ] RETINA EXAM REPORT  [  ] IMMUNIZATION RECORDS  [X] Release all info      [  ] Check here and initial the line next to each item to release ALL health information INCLUDING  _____ Care and treatment for drug and / or alcohol abuse  _____ HIV testing, infection status, or AIDS  _____ Genetic Testing    DATES OF SERVICE OR TIME PERIOD TO BE DISCLOSED: _____________  I understand and acknowledge that:  * This Authorization may be revoked at any time by you in writing, except if your health information has already been used or disclosed.  * Your health information that will be used or disclosed as a result of you signing this authorization could be re-disclosed by the recipient. If this occurs, your re-disclosed health information may no longer be protected by State or Federal laws.  * You may refuse to sign this Authorization. Your refusal will not affect your ability to obtain treatment.  * This Authorization becomes effective upon signing and will  on (date) __________.      If no date is  indicated, this Authorization will  one (1) year from the signature date.    Name: Mattie Alatorre    Signature:   Date:     2020       PLEASE FAX REQUESTED RECORDS BACK TO: [phone]

## 2020-12-11 NOTE — PROGRESS NOTES
Chief Complaint   Patient presents with   • Breast Problem     L Breast discharge, strong odor,           Mattie Alatorre is a 35 y.o. female here to establish care and to discuss the evaluation and management of:      Former PCP: Florencia Vázquez      For about 2 months she has been having left breast discharge, a strong odor.  Had noticed she had an odor of nipple discharge about 2 months ago.   Even before her pregnancy she would lay down on her stomach it would cause her to have left breast discomfort.     Breast feed her child until about 13 months. He is 2.5 yr now.     had dry and cracked and bleeding nipples with her first child. He is 16 years now. Had on both breasts but worse on left breast. Since then her nipple looks different.   Had a U/S in -without any findings.         Would like referral to Orthopedics for her right bunion. It has been bothering her for quite some time. Has on both feet but R>L. Wears steel toe boots.    ROS:  Denies any Headache, Blurred Vision, Confusion, Chest pain,  Shortness of breath,  Abdominal pain, Changes of bowel or bladder, Hematuria, Hematochezia, Lower ext. edema, Fevers, Nights sweats, Weight Changes, Focal weakness or numbness.  And all other systems reviewed and all are negative. Positive for : bunion, breast discharge.       Current Outpatient Medications:   •  clotrimazole-betamethasone (LOTRISONE) 1-0.05 % Cream, Apply 1 Application topically 2 times a day for 7 days., Disp: 15 g, Rfl: 0    No Known Allergies    Past Medical History:   Diagnosis Date   • Migraines      Past Surgical History:   Procedure Laterality Date   • TRACY BY LAPAROSCOPY N/A 2020    Procedure: CHOLECYSTECTOMY, LAPAROSCOPIC;  Surgeon: Hien French M.D.;  Location: SURGERY [facility];  Service: General   • PB ERCP,DIAGNOSTIC N/A 2020    Procedure: ERCP (ENDOSCOPIC RETROGRADE CHOLANGIOPANCREATOGRAPHY) with sphincterotomy and balloon sweep and stent placement;   "Surgeon: Julio Cesar King M.D.;  Location: SURGERY [facility];  Service: Gastroenterology   • PTERYGIUM EXCISION  10/3/2014    Performed by Eldon Rangel M.D. at SURGERY [facility] ORS     History reviewed. No pertinent family history.  Social History     Socioeconomic History   • Marital status:      Spouse name: Not on file   • Number of children: Not on file   • Years of education: Not on file   • Highest education level: Not on file   Occupational History   • Not on file   Social Needs   • Financial resource strain: Not on file   • Food insecurity     Worry: Not on file     Inability: Not on file   • Transportation needs     Medical: Not on file     Non-medical: Not on file   Tobacco Use   • Smoking status: Never Smoker   • Smokeless tobacco: Never Used   Substance and Sexual Activity   • Alcohol use: No   • Drug use: No   • Sexual activity: Yes     Partners: Male     Birth control/protection: None   Lifestyle   • Physical activity     Days per week: Not on file     Minutes per session: Not on file   • Stress: Not on file   Relationships   • Social connections     Talks on phone: Not on file     Gets together: Not on file     Attends [employer] service: Not on file     Active member of club or organization: Not on file     Attends meetings of clubs or organizations: Not on file     Relationship status: Not on file   • Intimate partner violence     Fear of current or ex partner: Not on file     Emotionally abused: Not on file     Physically abused: Not on file     Forced sexual activity: Not on file   Other Topics Concern   • Not on file   Social History Narrative   • Not on file       Objective:     Vitals: /70 (BP Location: Right arm, Patient Position: Sitting, BP Cuff Size: Adult)   Pulse 77   Temp 36.3 °C (97.3 °F) (Temporal)   Ht 1.537 m (5' 0.5\")   Wt 82.6 kg (182 lb 1.6 oz)   SpO2 98%   BMI 34.98 kg/m²      General: Alert, pleasant, NAD  HEENT:  Normocephalic.  Neck supple.  No " thyromegaly or masses palpated. No cervical or supraclavicular lymphadenopathy.  Heart:  Regular rate and rhythm.  S1 and S2 normal.  No murmurs appreciated.    Respiratory:  Normal respiratory effort.  Clear to auscultation bilaterally.    Breast: left nipple inverted and split. Small white discharge present. Palpable lump on superior areola 12 o'clock.  Skin:  Warm, dry, no rashes  Musculoskeletal:  Gait is normal.  Moves all extremities well.bilateral bunions  Extremities:   No leg edema.  Neurological: No tremors  Psych:  Affect/mood is normal, judgement is good, memory is intact, grooming is appropriate.      Assessment and Plan.     35 y.o. female to establish care and discuss the followin. Abnormal breast exam  New problem to me. Needs u/s and mammogram.   - MA-DIAGNOSTIC MAMMO BILAT W/TOMOSYNTHESIS W/CAD; Future    2. Yeast dermatitis  New problem to me. Trial of Lotrisone on left nipple. Follow up if not improved.   - clotrimazole-betamethasone (LOTRISONE) 1-0.05 % Cream; Apply 1 Application topically 2 times a day for 7 days.  Dispense: 15 g; Refill: 0    3. Bunion of right foot  - REFERRAL TO ORTHOPEDICS    4. Obesity (BMI 30-39.9)  Chronic. Patient encouraged to reduce excess calorie consumption. Encouraged regular exercise. Discussed long term sequelae of obesity.  - Patient identified as having weight management issue.  Appropriate orders and counseling given.    5. Encounter to establish care        No follow-ups on file.          Madeline HYDE

## 2020-12-11 NOTE — LETTER
formerly [facility]  KRYSTLE PeaceRSETH.  [address] [facility] [address]  [place] [address]  Fax: [phone]   Authorization for Release/Disclosure of   Protected Health Information   Name: JEREMÍAS GERMAN : 1985 SSN: [id number]   Address: [address] [place] [address]  [place] [address] Phone:    [phone] (home) [phone] (work)   I authorize the entity listed below to release/disclose the PHI below to:   formerly [facility]/MARY ELLEN Peace and MARY ELLEN Peace   Provider or Entity Name:                                                              [facility]     Address   City, State, Zip   Phone:      Fax:     Reason for request: continuity of care   Information to be released:    [  ] LAST COLONOSCOPY,  including any PATH REPORT and follow-up  [  ] LAST FIT/COLOGUARD RESULT [  ] LAST DEXA  [X] LAST MAMMOGRAM  [  ] LAST PAP  [  ] LAST LABS [  ] RETINA EXAM REPORT  [  ] IMMUNIZATION RECORDS  [  ] Release all info      [  ] Check here and initial the line next to each item to release ALL health information INCLUDING  _____ Care and treatment for drug and / or alcohol abuse  _____ HIV testing, infection status, or AIDS  _____ Genetic Testing    DATES OF SERVICE OR TIME PERIOD TO BE DISCLOSED: _____________  I understand and acknowledge that:  * This Authorization may be revoked at any time by you in writing, except if your health information has already been used or disclosed.  * Your health information that will be used or disclosed as a result of you signing this authorization could be re-disclosed by the recipient. If this occurs, your re-disclosed health information may no longer be protected by State or Federal laws.  * You may refuse to sign this Authorization. Your refusal will not affect your ability to obtain treatment.  * This Authorization becomes effective upon signing and will  on (date) __________.      If no date is indicated, this Authorization will  one (1) year  from the signature date.    Name: Mattie Alatorre    Signature:   Date:     12/11/2020       PLEASE FAX REQUESTED RECORDS BACK TO: [phone]

## 2020-12-28 ENCOUNTER — HOSPITAL ENCOUNTER (OUTPATIENT)
Dept: RADIOLOGY | Facility: MEDICAL CENTER | Age: 35
End: 2020-12-28
Payer: COMMERCIAL

## 2020-12-30 ENCOUNTER — HOSPITAL ENCOUNTER (OUTPATIENT)
Dept: RADIOLOGY | Facility: MEDICAL CENTER | Age: 35
End: 2020-12-30
Attending: NURSE PRACTITIONER
Payer: COMMERCIAL

## 2020-12-30 DIAGNOSIS — N64.59 ABNORMAL BREAST EXAM: ICD-10-CM

## 2020-12-30 PROCEDURE — G0279 TOMOSYNTHESIS, MAMMO: HCPCS

## 2020-12-30 PROCEDURE — 76642 ULTRASOUND BREAST LIMITED: CPT | Mod: LT

## 2021-01-13 PROBLEM — E66.9 OBESITY: Status: RESOLVED | Noted: 2020-01-19 | Resolved: 2021-01-13

## 2021-01-13 PROBLEM — K80.50 CHOLEDOCHOLITHIASIS: Status: RESOLVED | Noted: 2020-01-20 | Resolved: 2021-01-13

## 2021-01-28 ENCOUNTER — OFFICE VISIT (OUTPATIENT)
Dept: MEDICAL GROUP | Facility: MEDICAL CENTER | Age: 36
End: 2021-01-28
Payer: COMMERCIAL

## 2021-01-28 VITALS
BODY MASS INDEX: 35.5 KG/M2 | DIASTOLIC BLOOD PRESSURE: 70 MMHG | HEIGHT: 61 IN | SYSTOLIC BLOOD PRESSURE: 110 MMHG | TEMPERATURE: 98.6 F | WEIGHT: 188 LBS | HEART RATE: 62 BPM | OXYGEN SATURATION: 96 % | RESPIRATION RATE: 16 BRPM

## 2021-01-28 DIAGNOSIS — Z23 NEED FOR VACCINATION: ICD-10-CM

## 2021-01-28 DIAGNOSIS — E66.9 OBESITY (BMI 30-39.9): ICD-10-CM

## 2021-01-28 DIAGNOSIS — Z71.3 ENCOUNTER FOR WEIGHT LOSS COUNSELING: ICD-10-CM

## 2021-01-28 DIAGNOSIS — R63.5 WEIGHT GAIN: ICD-10-CM

## 2021-01-28 PROCEDURE — 90746 HEPB VACCINE 3 DOSE ADULT IM: CPT | Performed by: NURSE PRACTITIONER

## 2021-01-28 PROCEDURE — 99213 OFFICE O/P EST LOW 20 MIN: CPT | Mod: 25 | Performed by: NURSE PRACTITIONER

## 2021-01-28 PROCEDURE — 90471 IMMUNIZATION ADMIN: CPT | Performed by: NURSE PRACTITIONER

## 2021-01-28 RX ORDER — PHENTERMINE HYDROCHLORIDE 15 MG/1
15 CAPSULE ORAL EVERY MORNING
Qty: 30 CAP | Refills: 0 | Status: SHIPPED | OUTPATIENT
Start: 2021-01-28 | End: 2021-02-27

## 2021-01-28 ASSESSMENT — PATIENT HEALTH QUESTIONNAIRE - PHQ9: CLINICAL INTERPRETATION OF PHQ2 SCORE: 0

## 2021-01-28 ASSESSMENT — FIBROSIS 4 INDEX: FIB4 SCORE: 0.34

## 2021-01-28 NOTE — PROGRESS NOTES
Chief Complaint   Patient presents with   • Other     weight gain       Subjective:     HPI:     Mattie Alatorre is a 35 y.o. female here to discuss the evaluation and management of:    Having a hard time with her weight. Frustrated with her weight now.   Working a lot and her schedule changes.  Having a hard time with sleeping as well. Feels likes her lack of sleep is contributing.   She is active at her job and runs a few times a week and does cardio.  Had a medication about 4-5 years ago that was helpful to suppress her appetite. Interested in trying this again. She Ideal weight for her 140lb.       ROS:  Denies any Headache, Blurred Vision, Confusion, Chest pain,  Shortness of breath,  Abdominal pain, Changes of bowel or bladder, Lower ext edema, Fevers, Nights sweats, Weight Changes, Focal weakness or numbness.  And all other systems reviewed and are all negative. POSITIVE FOR weight gain        Current Outpatient Medications:   •  phentermine 15 MG capsule, Take 1 Cap by mouth every morning for 30 days., Disp: 30 Cap, Rfl: 0    No Known Allergies    Past Medical History:   Diagnosis Date   • Migraines      Past Surgical History:   Procedure Laterality Date   • TRACY BY LAPAROSCOPY N/A 1/22/2020    Procedure: CHOLECYSTECTOMY, LAPAROSCOPIC;  Surgeon: Hien French M.D.;  Location: [facility];  Service: General   • PB ERCP,DIAGNOSTIC N/A 1/21/2020    Procedure: ERCP (ENDOSCOPIC RETROGRADE CHOLANGIOPANCREATOGRAPHY) with sphincterotomy and balloon sweep and stent placement;  Surgeon: Julio Cesar King M.D.;  Location: [facility];  Service: Gastroenterology   • PTERYGIUM EXCISION  10/3/2014    Performed by Eldon Rangel M.D. at [facility]     No family history on file.  Social History     Socioeconomic History   • Marital status:      Spouse name: Not on file   • Number of children: Not on file   • Years of education: Not on file   • Highest education level:  "Not on file   Occupational History   • Not on file   Social Needs   • Financial resource strain: Not on file   • Food insecurity     Worry: Not on file     Inability: Not on file   • Transportation needs     Medical: Not on file     Non-medical: Not on file   Tobacco Use   • Smoking status: Never Smoker   • Smokeless tobacco: Never Used   Substance and Sexual Activity   • Alcohol use: No   • Drug use: No   • Sexual activity: Yes     Partners: Male     Birth control/protection: None   Lifestyle   • Physical activity     Days per week: Not on file     Minutes per session: Not on file   • Stress: Not on file   Relationships   • Social connections     Talks on phone: Not on file     Gets together: Not on file     Attends Yazdanism service: Not on file     Active member of club or organization: Not on file     Attends meetings of clubs or organizations: Not on file     Relationship status: Not on file   • Intimate partner violence     Fear of current or ex partner: Not on file     Emotionally abused: Not on file     Physically abused: Not on file     Forced sexual activity: Not on file   Other Topics Concern   • Not on file   Social History Narrative   • Not on file       Objective:     Vitals: /70   Pulse 62   Temp 37 °C (98.6 °F)   Resp 16   Ht 1.537 m (5' 0.5\")   Wt 85.3 kg (188 lb)   SpO2 96%   BMI 36.11 kg/m²    General: Alert, pleasant, NAD  HEENT: Normocephalic.  Neck supple.  No thyromegaly or masses palpated. No cervical or supraclavicular lymphadenopathy.  Heart: Regular rate and rhythm.  S1 and S2 normal.  No murmurs appreciated.  Respiratory: Normal respiratory effort.  Clear to auscultation bilaterally. No wheezing  Skin: Warm, dry, no rashes.  Extremities: No leg edema. No discoloration  Neurological: No tremors  Psych:  Affect/mood is normal, judgement is good, memory is intact, grooming is appropriate.    Assessment/Plan:     Mattie was seen today for other.    Diagnoses and all orders for " this visit:    Encounter for weight loss counseling  Weight gain  Obesity (BMI 30-39.9)  Weight has been a chronic problem for the patient however she is finding it more difficult to lose weight.  Continues to be active at her job as well as participate in cardio.  Have discussed working on reducing the amount of sugary drinks, avoiding sodas, alcohol, packaged processed foods, excessive sweets, concentrated sweets.  Increase her activity.  Make sure she is staying adequately hydrated and trying to get adequate sleep.  Has been on phentermine in the past.  Have discussed medication implications as well as side effects.   checked.  Have sent for phentermine.  Patient encouraged to reduce excess calorie consumption. Encouraged regular exercise. Discussed long term sequelae of obesity.  -     phentermine 15 MG capsule; Take 1 Cap by mouth every morning for 30 days.    Need for vaccination  -     Hepatitis B Vaccine Adult IM      Return in about 4 weeks (around 2/25/2021) for Med Check.    {I have placed the above orders and discussed them with an approved delegating provider.  The MA is performing the below orders under the direction of Dr. Ned HYDE

## 2021-03-05 NOTE — ANESTHESIA TIME REPORT
Anesthesia Start and Stop Event Times     Date Time Event    1/22/2020 0814 Ready for Procedure     0817 Anesthesia Start     0852 Anesthesia Stop        Responsible Staff  01/22/20    Name Role Begin End    Galileo Malin M.D. Anesth 0817 0852        Preop Diagnosis (Free Text):  Pre-op Diagnosis     cholelithiasis, choledocolithiasis        Preop Diagnosis (Codes):    Post op Diagnosis  Cholelithiasis      Premium Reason  Non-Premium    Comments:                                                                       
No

## 2021-03-08 ENCOUNTER — OFFICE VISIT (OUTPATIENT)
Dept: MEDICAL GROUP | Facility: MEDICAL CENTER | Age: 36
End: 2021-03-08
Payer: COMMERCIAL

## 2021-03-08 VITALS
DIASTOLIC BLOOD PRESSURE: 62 MMHG | HEIGHT: 61 IN | OXYGEN SATURATION: 98 % | TEMPERATURE: 97.7 F | WEIGHT: 180 LBS | BODY MASS INDEX: 33.99 KG/M2 | HEART RATE: 76 BPM | SYSTOLIC BLOOD PRESSURE: 96 MMHG

## 2021-03-08 DIAGNOSIS — R63.5 WEIGHT GAIN: ICD-10-CM

## 2021-03-08 DIAGNOSIS — Z71.3 ENCOUNTER FOR WEIGHT LOSS COUNSELING: ICD-10-CM

## 2021-03-08 DIAGNOSIS — E66.9 OBESITY (BMI 30-39.9): ICD-10-CM

## 2021-03-08 PROCEDURE — 99213 OFFICE O/P EST LOW 20 MIN: CPT | Performed by: NURSE PRACTITIONER

## 2021-03-08 RX ORDER — PHENTERMINE HYDROCHLORIDE 15 MG/1
30 CAPSULE ORAL EVERY MORNING
Qty: 60 CAPSULE | Refills: 0 | Status: SHIPPED | OUTPATIENT
Start: 2021-03-08 | End: 2021-04-07

## 2021-03-08 RX ORDER — PHENTERMINE HYDROCHLORIDE 15 MG/1
15 CAPSULE ORAL EVERY MORNING
COMMUNITY
End: 2021-03-08 | Stop reason: SDUPTHER

## 2021-03-08 ASSESSMENT — FIBROSIS 4 INDEX: FIB4 SCORE: 0.34

## 2021-03-09 NOTE — PROGRESS NOTES
Chief Complaint   Patient presents with   • Follow-Up       Subjective:     HPI:     Mattie Alatorre is a 35 y.o. female here to discuss the evaluation and management of:    1. Encounter for weight loss counseling  2. Weight gain  3. Obesity (BMI 30-39.9)  Patient presents today to follow-up on her last visit in January.  Started on phentermine 15 mg to help with weight loss.  Patient has lost weight since her visit.  She is pleased by this.  Denies any side effects from the medication.  Has been exercising routinely, feeling more energetic.  She is also been watching her diet and keeping track of her calories and is pleased with results so far.    ROS:  Denies any Headache, Blurred Vision, Confusion, Chest pain,  Shortness of breath,  Abdominal pain, Changes of bowel or bladder, Lower ext edema, Fevers, Nights sweats, Weight Changes, Focal weakness or numbness.  And all other systems reviewed and are all negative. POSITIVE FOR n/a        Current Outpatient Medications:   •  phentermine 15 MG capsule, Take 2 Capsules by mouth every morning for 30 days., Disp: 60 capsule, Rfl: 0    No Known Allergies    Past Medical History:   Diagnosis Date   • Migraines      Past Surgical History:   Procedure Laterality Date   • TRACY BY LAPAROSCOPY N/A 1/22/2020    Procedure: CHOLECYSTECTOMY, LAPAROSCOPIC;  Surgeon: Hien French M.D.;  Location: [facility];  Service: General   • PB ERCP,DIAGNOSTIC N/A 1/21/2020    Procedure: ERCP (ENDOSCOPIC RETROGRADE CHOLANGIOPANCREATOGRAPHY) with sphincterotomy and balloon sweep and stent placement;  Surgeon: Julio Cesar King M.D.;  Location: [facility];  Service: Gastroenterology   • PTERYGIUM EXCISION  10/3/2014    Performed by Eldon Rangel M.D. at [facility]     No family history on file.  Social History     Socioeconomic History   • Marital status:      Spouse name: Not on file   • Number of children: Not on file   • Years of  "education: Not on file   • Highest education level: Not on file   Occupational History   • Not on file   Tobacco Use   • Smoking status: Never Smoker   • Smokeless tobacco: Never Used   Substance and Sexual Activity   • Alcohol use: No   • Drug use: No   • Sexual activity: Yes     Partners: Male     Birth control/protection: None   Other Topics Concern   • Not on file   Social History Narrative   • Not on file     Social Determinants of Health     Financial Resource Strain:    • Difficulty of Paying Living Expenses:    Food Insecurity:    • Worried About Running Out of Food in the Last Year:    • Ran Out of Food in the Last Year:    Transportation Needs:    • Lack of Transportation (Medical):    • Lack of Transportation (Non-Medical):    Physical Activity:    • Days of Exercise per Week:    • Minutes of Exercise per Session:    Stress:    • Feeling of Stress :    Social Connections:    • Frequency of Communication with Friends and Family:    • Frequency of Social Gatherings with Friends and Family:    • Attends Voodoo Services:    • Active Member of Clubs or Organizations:    • Attends Club or Organization Meetings:    • Marital Status:    Intimate Partner Violence:    • Fear of Current or Ex-Partner:    • Emotionally Abused:    • Physically Abused:    • Sexually Abused:        Objective:     Vitals: BP (!) 96/62 (BP Location: Right arm, Patient Position: Sitting, BP Cuff Size: Adult)   Pulse 76   Temp 36.5 °C (97.7 °F)   Ht 1.537 m (5' 0.5\")   Wt 81.6 kg (180 lb)   LMP 02/12/2021 (Approximate)   SpO2 98%   Breastfeeding No   BMI 34.58 kg/m²    General: Alert, pleasant, NAD  HEENT: Normocephalic.   Skin: Warm, dry, no rashes.  Extremities: No leg edema. No discoloration  Neurological: No tremors  Psych:  Affect/mood is normal, judgement is good, memory is intact, grooming is appropriate.    Assessment/Plan:     Mattie was seen today for follow-up.    Diagnoses and all orders for this visit:    Encounter " for weight loss counseling  Weight gain  Obesity (BMI 30-39.9)  Has lost weight since her last visit.  No side effects from phentermine.  Has been diligent with exercise, managing her diet, reducing excessive caloric intake.  Feeling more energy.  Have again discussed working towards heart healthy diet, limiting alcohol, regular exercise and limiting sweets, processed sugars, sodas as well as taking the phentermine.  Patient does understand this is a temporary medication.   checked.  No inconsistencies.  Have sent the 15 mg with instructions that she may take a total of 30 mg if she has any side effects she will be able to reduce down to 15 mg.  Follow-up 1 month for weight check.  -     phentermine 15 MG capsule; Take 2 Capsules by mouth every morning for 30 days.        Return in about 4 weeks (around 4/5/2021) for Med Check.          Madeline MARTE.

## 2021-05-28 ENCOUNTER — HOSPITAL ENCOUNTER (OUTPATIENT)
Facility: MEDICAL CENTER | Age: 36
End: 2021-05-28
Attending: NURSE PRACTITIONER
Payer: COMMERCIAL

## 2021-05-28 ENCOUNTER — OFFICE VISIT (OUTPATIENT)
Dept: MEDICAL GROUP | Facility: MEDICAL CENTER | Age: 36
End: 2021-05-28
Payer: COMMERCIAL

## 2021-05-28 VITALS
HEIGHT: 61 IN | WEIGHT: 167 LBS | TEMPERATURE: 97.4 F | BODY MASS INDEX: 31.53 KG/M2 | HEART RATE: 73 BPM | DIASTOLIC BLOOD PRESSURE: 68 MMHG | SYSTOLIC BLOOD PRESSURE: 98 MMHG | OXYGEN SATURATION: 98 %

## 2021-05-28 DIAGNOSIS — Z13.228 SCREENING FOR METABOLIC DISORDER: ICD-10-CM

## 2021-05-28 DIAGNOSIS — Z12.4 PAP SMEAR FOR CERVICAL CANCER SCREENING: ICD-10-CM

## 2021-05-28 DIAGNOSIS — Z13.6 SCREENING FOR CARDIOVASCULAR CONDITION: ICD-10-CM

## 2021-05-28 DIAGNOSIS — Z13.29 SCREENING FOR THYROID DISORDER: ICD-10-CM

## 2021-05-28 DIAGNOSIS — Z71.3 ENCOUNTER FOR WEIGHT LOSS COUNSELING: ICD-10-CM

## 2021-05-28 PROCEDURE — 87591 N.GONORRHOEAE DNA AMP PROB: CPT

## 2021-05-28 PROCEDURE — 99000 SPECIMEN HANDLING OFFICE-LAB: CPT | Performed by: NURSE PRACTITIONER

## 2021-05-28 PROCEDURE — 87624 HPV HI-RISK TYP POOLED RSLT: CPT

## 2021-05-28 PROCEDURE — 99395 PREV VISIT EST AGE 18-39: CPT | Performed by: NURSE PRACTITIONER

## 2021-05-28 PROCEDURE — 87491 CHLMYD TRACH DNA AMP PROBE: CPT

## 2021-05-28 PROCEDURE — 88175 CYTOPATH C/V AUTO FLUID REDO: CPT

## 2021-05-28 RX ORDER — PHENTERMINE HYDROCHLORIDE 15 MG/1
15 CAPSULE ORAL EVERY MORNING
COMMUNITY
End: 2021-05-28 | Stop reason: SDUPTHER

## 2021-05-28 RX ORDER — PHENTERMINE HYDROCHLORIDE 15 MG/1
15 CAPSULE ORAL EVERY MORNING
Qty: 30 CAPSULE | Refills: 0 | Status: SHIPPED | OUTPATIENT
Start: 2021-05-28 | End: 2021-06-27

## 2021-05-28 ASSESSMENT — FIBROSIS 4 INDEX: FIB4 SCORE: 0.34

## 2021-05-28 NOTE — PROGRESS NOTES
CC:  Pap/Well Woman Exam    History of present illness:    Mattie Alatorre is 35 y.o. female presenting today for well woman exam with gynecological exam and Pap smear.   She reports her periods are regular.  She is currently using nothing as birth control. Having periods She is currently is  exercising on a routine basis. Denies dysuria, denies dyspareunia     Last mammogram: n/a-2020  Last Dexa scan: n/a  Osteoporosis Screen/ DEXA: n/a   Last colonoscopy: n/a  No family hx of breast cancer/uterine or ovarian cancer    /Para:  4/4    Past Gynecological History  Patient's last menstrual period was Patient's last menstrual period was 2021 (approximate).    BC or HRT: none  Postmenopausal?: n/a  GYN surgery no  Postmenopausal bleeding?: n/a  Menarche: 14  Menses: regular  Sexually active: yes  History of sexually transmitted infections: remote hx   Last Pap: 2018 . Denies any history of abnormal pap    Symptoms of overactive bladder: no  Symptoms of stress incontinence: no  Symptoms of bowel incontinence: no  Feeling of vaginal bulge:no  History of sexual abuse:no  Fibroids?: no  Ovarian cysts?: no      Folate intake: n/a   PT/vit D for falls prevention: n/a   Cholesterol Screening: will order   Diabetes Screening: will order  TSH: will order     Past Medical History:   Diagnosis Date   • Migraines        Past Surgical History:   Procedure Laterality Date   • TRACY BY LAPAROSCOPY N/A 2020    Procedure: CHOLECYSTECTOMY, LAPAROSCOPIC;  Surgeon: Hien French M.D.;  Location: [facility];  Service: General   • PB ERCP,DIAGNOSTIC N/A 2020    Procedure: ERCP (ENDOSCOPIC RETROGRADE CHOLANGIOPANCREATOGRAPHY) with sphincterotomy and balloon sweep and stent placement;  Surgeon: Julio Cesar King M.D.;  Location: [facility];  Service: Gastroenterology   • PTERYGIUM EXCISION  10/3/2014    Performed by Eldon Rangel M.D. at [facility]       Outpatient  Encounter Medications as of 5/28/2021   Medication Sig Dispense Refill   • phentermine 15 MG capsule Take 1 capsule by mouth every morning for 30 days. 30 capsule 0   • [DISCONTINUED] phentermine 15 MG capsule Take 15 mg by mouth every morning.       No facility-administered encounter medications on file as of 5/28/2021.       Patient Active Problem List    Diagnosis Date Noted   • Obesity (BMI 30-39.9) 12/11/2020   • Abnormal LFTs 01/19/2020       .  Social History     Socioeconomic History   • Marital status:      Spouse name: Not on file   • Number of children: Not on file   • Years of education: Not on file   • Highest education level: Not on file   Occupational History   • Not on file   Tobacco Use   • Smoking status: Never Smoker   • Smokeless tobacco: Never Used   Vaping Use   • Vaping Use: Never used   Substance and Sexual Activity   • Alcohol use: No   • Drug use: No   • Sexual activity: Yes     Partners: Male     Birth control/protection: None   Other Topics Concern   • Not on file   Social History Narrative   • Not on file     Social Determinants of Health     Financial Resource Strain:    • Difficulty of Paying Living Expenses:    Food Insecurity:    • Worried About Running Out of Food in the Last Year:    • Ran Out of Food in the Last Year:    Transportation Needs:    • Lack of Transportation (Medical):    • Lack of Transportation (Non-Medical):    Physical Activity:    • Days of Exercise per Week:    • Minutes of Exercise per Session:    Stress:    • Feeling of Stress :    Social Connections:    • Frequency of Communication with Friends and Family:    • Frequency of Social Gatherings with Friends and Family:    • Attends Mormonism Services:    • Active Member of Clubs or Organizations:    • Attends Club or Organization Meetings:    • Marital Status:    Intimate Partner Violence:    • Fear of Current or Ex-Partner:    • Emotionally Abused:    • Physically Abused:    • Sexually Abused:   "      Family History   Problem Relation Age of Onset   • Diabetes Father          ROS: Denies Weight loss, fatigue, chest pain, SOB, bowel or bladder changes.  Denies musculoskeletal, neurological, or psychiatric problems.  Denies dysuria, dyspareunia or abnormal vaginal discharge.      States in a safe relationship yes      BP (!) 98/68   Pulse 73   Temp 36.3 °C (97.4 °F)   Ht 1.537 m (5' 0.5\")   Wt 75.8 kg (167 lb)   LMP 05/08/2021 (Approximate)   SpO2 98%   Breastfeeding No   BMI 32.08 kg/m²     GEN:  Appears well and in no apparent distress   NECK:  Supple without adenopathy or thyromegaly  LUNGS:  Clear to auscultation.  Normal breath sounds.  CV:  RRR without murmers or S3 or S4.  Peripheral pulses intact.  BREAST:  deferred   ABD:  Soft, non-tender, non-distended, normal bowel sounds.  No hepatosplenomegaly.  :  Normal external female genitalia.  Vaginal canal clear.  Cervix appears normal.  Bimanual exam:  No CMT, normal size uterus without masses or tenderness; no adnexal masses or tenderness.      Assessment and plan      1. Pap smear for cervical cancer screening  - THINPREP PAP W/HPV AND CTNG; Future    2. Screening for cardiovascular condition  - Lipid Profile; Future    3. Screening for thyroid disorder  - TSH WITH REFLEX TO FT4; Future    4. Screening for metabolic disorder  - Comp Metabolic Panel; Future    5. Encounter for weight loss counseling  Has been successful on phentermine 15 mg.  Continues to exercise, watch her diet intake.  Will refill 1 more month of the 15 mg.  The 30 mg and make her have heart racing.  - phentermine 15 MG capsule; Take 1 capsule by mouth every morning for 30 days.  Dispense: 30 capsule; Refill: 0        Education:  Have encouraged the patient to have a heart healthy diet, rich in fruits and vegetables. Limit alcohol use, avoid tobacco products. Participate in physical activity at least 3-5 times weekly. Take a multivitamin daily. Continue with recommended " screenings and immunizations.       A chaperone was offered to the patient during today's exam. Patient declined chaperone.    I have placed the below orders and discussed them with an approved delegating provider.  The MA is performing the below orders under the direction of Dr. Marino.      Pap Smear  Specimen collected today will await results from the lab.        Madeline GONSALEZ

## 2021-06-02 LAB
C TRACH DNA GENITAL QL NAA+PROBE: NEGATIVE
CYTOLOGY REG CYTOL: NORMAL
HPV HR 12 DNA CVX QL NAA+PROBE: NEGATIVE
HPV16 DNA SPEC QL NAA+PROBE: NEGATIVE
HPV18 DNA SPEC QL NAA+PROBE: NEGATIVE
N GONORRHOEA DNA GENITAL QL NAA+PROBE: NEGATIVE
SPECIMEN SOURCE: NORMAL
SPECIMEN SOURCE: NORMAL

## 2022-04-08 ENCOUNTER — APPOINTMENT (OUTPATIENT)
Dept: MEDICAL GROUP | Facility: MEDICAL CENTER | Age: 37
End: 2022-04-08
Payer: COMMERCIAL

## 2022-04-21 ENCOUNTER — OFFICE VISIT (OUTPATIENT)
Dept: MEDICAL GROUP | Facility: MEDICAL CENTER | Age: 37
End: 2022-04-21
Payer: COMMERCIAL

## 2022-04-21 VITALS
HEIGHT: 61 IN | TEMPERATURE: 96.9 F | OXYGEN SATURATION: 96 % | DIASTOLIC BLOOD PRESSURE: 72 MMHG | WEIGHT: 174 LBS | HEART RATE: 70 BPM | SYSTOLIC BLOOD PRESSURE: 104 MMHG | BODY MASS INDEX: 32.85 KG/M2

## 2022-04-21 DIAGNOSIS — Z13.29 SCREENING FOR THYROID DISORDER: ICD-10-CM

## 2022-04-21 DIAGNOSIS — Z00.00 ROUTINE GENERAL MEDICAL EXAMINATION AT A HEALTH CARE FACILITY: ICD-10-CM

## 2022-04-21 DIAGNOSIS — Z13.228 SCREENING FOR METABOLIC DISORDER: ICD-10-CM

## 2022-04-21 DIAGNOSIS — N64.4 BREAST PAIN, LEFT: ICD-10-CM

## 2022-04-21 DIAGNOSIS — R79.89 ABNORMAL LFTS: ICD-10-CM

## 2022-04-21 PROCEDURE — 99395 PREV VISIT EST AGE 18-39: CPT | Performed by: NURSE PRACTITIONER

## 2022-04-21 ASSESSMENT — PATIENT HEALTH QUESTIONNAIRE - PHQ9: CLINICAL INTERPRETATION OF PHQ2 SCORE: 0

## 2022-04-21 NOTE — PROGRESS NOTES
Chief Complaint   Patient presents with   • Annual Exam     Preventative       Subjective:     HPI:     Mattie Alatorre is a 36 y.o. female   here to discuss the evaluation and management of:       1. Routine general medical examination at a health care facility  Up to date on dental and eye exams. No ETOH, no tobacco, no illicit drugs. Exercising 3-4 days per week.     2. Breast pain, left  Ongoing issue, previous imaging without any significant findings. Bothersome when laying on her stomach or a tight hug.     3. Abnormal LFTs  Historically present. Trending down after cholecystectomy. Due for update of labs.       ROS:  Denies any Headache, Blurred Vision, Confusion, Chest pain,  Shortness of breath,  Abdominal pain, Changes of bowel or bladder, Lower ext edema, Fevers, Nights sweats, Weight Changes, Focal weakness or numbness.  And all other systems reviewed and are all negative. POSITIVE FOR : see above      No current outpatient medications on file.    No Known Allergies    Past Medical History:   Diagnosis Date   • Migraines      Past Surgical History:   Procedure Laterality Date   • TRACY BY LAPAROSCOPY N/A 1/22/2020    Procedure: CHOLECYSTECTOMY, LAPAROSCOPIC;  Surgeon: Hien French M.D.;  Location: [facility];  Service: General   • DC ERCP,DIAGNOSTIC N/A 1/21/2020    Procedure: ERCP (ENDOSCOPIC RETROGRADE CHOLANGIOPANCREATOGRAPHY) with sphincterotomy and balloon sweep and stent placement;  Surgeon: Julio Cesar King M.D.;  Location: [facility];  Service: Gastroenterology   • PTERYGIUM EXCISION  10/3/2014    Performed by Eldon Rangel M.D. at [facility]     Family History   Problem Relation Age of Onset   • Diabetes Father      Social History     Socioeconomic History   • Marital status:      Spouse name: Not on file   • Number of children: Not on file   • Years of education: Not on file   • Highest education level: Not on file   Occupational History  "  • Not on file   Tobacco Use   • Smoking status: Never Smoker   • Smokeless tobacco: Never Used   Vaping Use   • Vaping Use: Never used   Substance and Sexual Activity   • Alcohol use: No   • Drug use: No   • Sexual activity: Yes     Partners: Male     Birth control/protection: None   Other Topics Concern   • Not on file   Social History Narrative   • Not on file     Social Determinants of Health     Financial Resource Strain: Not on file   Food Insecurity: Not on file   Transportation Needs: Not on file   Physical Activity: Not on file   Stress: Not on file   Social Connections: Not on file   Intimate Partner Violence: Not on file   Housing Stability: Not on file       Objective:     Vitals: /72 (BP Location: Right arm, Patient Position: Sitting, BP Cuff Size: Adult)   Pulse 70   Temp 36.1 °C (96.9 °F) (Temporal)   Ht 1.537 m (5' 0.5\")   Wt 78.9 kg (174 lb)   LMP 04/14/2022   SpO2 96%   Breastfeeding No   BMI 33.42 kg/m²    General: Alert, pleasant, NAD  HEENT: Normocephalic.  Neck supple.   Respiratory: no distress, no audible wheezing, RR -WNL  Skin: Warm, dry, no rashes.  Extremities: No leg edema. No discoloration  Neurological: No tremors  Psych:  Affect/mood is normal, judgement is good, memory is intact, grooming is appropriate.    Assessment/Plan:     Mattie was seen today for annual exam.    Diagnoses and all orders for this visit:    Routine general medical examination at a health care facility  Relatively healthy adult 36 year old female.   Continue with routine dental, and eye screening, avoid ETOH, illicit drugs and tobacco. Continue exercise routine.     Breast pain, left  Ongoing problem. Since it has been about 2 years will need diagnostic mammogram and ultrasound. Consider MRI of breast once mammogram and ultrasound are completed.   -     MA-DIAGNOSTIC MAMMO BILAT W/TOMOSYNTHESIS W/CAD; Future  -     US-BREAST LIMITED-LEFT; Future    Abnormal LFTs  Update CMP.    Screening for " metabolic disorder  -     Comp Metabolic Panel; Future    Screening for thyroid disorder  -     TSH WITH REFLEX TO FT4; Future      Return for Annual Preventative Exam or for abnormal labs..          Madeline MARTE.

## 2022-04-24 PROBLEM — N64.4 BREAST PAIN, LEFT: Status: ACTIVE | Noted: 2022-04-24

## 2022-12-21 ENCOUNTER — OFFICE VISIT (OUTPATIENT)
Dept: MEDICAL GROUP | Facility: MEDICAL CENTER | Age: 37
End: 2022-12-21
Payer: COMMERCIAL

## 2022-12-21 VITALS
SYSTOLIC BLOOD PRESSURE: 112 MMHG | HEIGHT: 61 IN | HEART RATE: 91 BPM | WEIGHT: 180 LBS | OXYGEN SATURATION: 97 % | BODY MASS INDEX: 33.99 KG/M2 | TEMPERATURE: 97.6 F | DIASTOLIC BLOOD PRESSURE: 74 MMHG

## 2022-12-21 DIAGNOSIS — R05.2 SUBACUTE COUGH: ICD-10-CM

## 2022-12-21 LAB
EXTERNAL QUALITY CONTROL: NORMAL
INT CON NEG: NEGATIVE
INT CON POS: POSITIVE
SARS-COV+SARS-COV-2 AG RESP QL IA.RAPID: NEGATIVE

## 2022-12-21 PROCEDURE — 99213 OFFICE O/P EST LOW 20 MIN: CPT | Performed by: FAMILY MEDICINE

## 2022-12-21 PROCEDURE — 87426 SARSCOV CORONAVIRUS AG IA: CPT | Performed by: FAMILY MEDICINE

## 2022-12-21 RX ORDER — BENZONATATE 100 MG/1
100 CAPSULE ORAL 3 TIMES DAILY PRN
Qty: 60 CAPSULE | Refills: 0 | Status: SHIPPED | OUTPATIENT
Start: 2022-12-21 | End: 2023-02-02

## 2022-12-21 RX ORDER — AMOXICILLIN 875 MG/1
875 TABLET, COATED ORAL 2 TIMES DAILY
Qty: 14 TABLET | Refills: 0 | Status: SHIPPED | OUTPATIENT
Start: 2022-12-21 | End: 2022-12-28

## 2022-12-21 ASSESSMENT — ENCOUNTER SYMPTOMS
NAUSEA: 0
SORE THROAT: 1
DIARRHEA: 0
WHEEZING: 0
HEADACHES: 0
FEVER: 0
CHILLS: 0
VOMITING: 0
SPUTUM PRODUCTION: 1
COUGH: 1
BLURRED VISION: 0
SHORTNESS OF BREATH: 1

## 2022-12-21 NOTE — PROGRESS NOTES
"Subjective:     CC: \"cough\"    HPI:   Mattie presents today with:    Dry cough  Started 3 weeks ago  Seemed like a normal cold  Used OTC medication  Seems to be worsening  Has a dry throat and cough can be productive, has had some blood streaks  Sputum is greenish  Had flu shot at work health fair on October 22nd also had Covid booster  No known allergies to antibiotics      No problems updated.    Current Outpatient Medications Ordered in Epic   Medication Sig Dispense Refill    benzonatate (TESSALON) 100 MG Cap Take 1 Capsule by mouth 3 times a day as needed for Cough. 60 Capsule 0    amoxicillin (AMOXIL) 875 MG tablet Take 1 Tablet by mouth 2 times a day for 7 days. 14 Tablet 0     No current Epic-ordered facility-administered medications on file.       Health Maintenance: Has had flu shot and covid vaccines    ROS:  Review of Systems   Constitutional:  Negative for chills, fever and malaise/fatigue.   HENT:  Positive for sore throat. Negative for congestion.    Eyes:  Negative for blurred vision.   Respiratory:  Positive for cough, sputum production and shortness of breath. Negative for wheezing.    Cardiovascular:  Negative for chest pain.   Gastrointestinal:  Negative for diarrhea, nausea and vomiting.   Genitourinary:  Negative for dysuria.   Neurological:  Negative for headaches.     Objective:     Exam:  /74   Pulse 91   Temp 36.4 °C (97.6 °F) (Temporal)   Ht 1.537 m (5' 0.5\")   Wt 81.6 kg (180 lb)   SpO2 97%   BMI 34.58 kg/m²  Body mass index is 34.58 kg/m².    Physical Exam  Vitals reviewed.   Constitutional:       General: She is not in acute distress.     Appearance: Normal appearance. She is ill-appearing. She is not toxic-appearing.   HENT:      Head: Normocephalic and atraumatic.      Right Ear: Tympanic membrane normal.      Left Ear: Tympanic membrane normal.      Nose: No congestion.      Mouth/Throat:      Mouth: Mucous membranes are moist.      Pharynx: Posterior oropharyngeal " erythema present. No oropharyngeal exudate.   Eyes:      Conjunctiva/sclera: Conjunctivae normal.   Cardiovascular:      Rate and Rhythm: Normal rate and regular rhythm.      Heart sounds: Normal heart sounds.   Pulmonary:      Effort: Pulmonary effort is normal. No respiratory distress.      Breath sounds: Normal breath sounds. No wheezing.   Musculoskeletal:      Cervical back: No tenderness.   Lymphadenopathy:      Cervical: Cervical adenopathy present.   Neurological:      Mental Status: She is alert.     Labs: POC Covid Negative      Assessment & Plan:     37 y.o. female with the following -     Problem List Items Addressed This Visit    None  Visit Diagnoses       Subacute cough      Patient has had cough for 3 weeks, with signs of worsening sputum production and generally feeling worse. Concern for brewing bronchitis will start treatment with amoxicillin and wrote for tessalon as well. Patient has been doing good supprotive care encouraged to keep that up and get some rest.    Relevant Orders    POCT SARS-COV Antigen ROSIO (Symptomatic only) (Completed)                Return if symptoms worsen or fail to improve.    Please note that this dictation was created using voice recognition software. I have made every reasonable attempt to correct obvious errors, but I expect that there are errors of grammar and possibly content that I did not discover before finalizing the note.

## 2022-12-21 NOTE — PATIENT INSTRUCTIONS
My recommendations for an upper respiratory infection:  - Use a humidifier, especially at night. Cold or warm water humidifiers have the same effect.  - Greg Med squeeze bottle sinus rinses twice a day.  - Hot tea + honey + fresh lemon juice  - Throat Coat herbal tea  - Honey by itself has been shown to help fight bugs and provide cough relief  - Chicken noodle soup has also been shown to help fight bugs  - Drink plenty of water  - Vitamin C supplementation   - Tylenol (no more than 3,000 mg in a day) as needed  - Ibuprofen (no more than 2,400 mg in a day) as needed

## 2023-02-02 ENCOUNTER — OFFICE VISIT (OUTPATIENT)
Dept: MEDICAL GROUP | Facility: MEDICAL CENTER | Age: 38
End: 2023-02-02
Payer: COMMERCIAL

## 2023-02-02 VITALS
DIASTOLIC BLOOD PRESSURE: 60 MMHG | WEIGHT: 175 LBS | BODY MASS INDEX: 34.36 KG/M2 | SYSTOLIC BLOOD PRESSURE: 100 MMHG | HEART RATE: 72 BPM | HEIGHT: 60 IN | TEMPERATURE: 98.3 F | OXYGEN SATURATION: 92 %

## 2023-02-02 DIAGNOSIS — E66.9 OBESITY (BMI 30-39.9): ICD-10-CM

## 2023-02-02 DIAGNOSIS — Z00.00 ROUTINE GENERAL MEDICAL EXAMINATION AT A HEALTH CARE FACILITY: ICD-10-CM

## 2023-02-02 DIAGNOSIS — N92.1 PROLONGED MENSTRUAL CYCLE: ICD-10-CM

## 2023-02-02 DIAGNOSIS — Z30.09 ENCOUNTER FOR STERILIZATION EDUCATION: ICD-10-CM

## 2023-02-02 DIAGNOSIS — Z13.6 SCREENING FOR CARDIOVASCULAR CONDITION: ICD-10-CM

## 2023-02-02 DIAGNOSIS — R79.89 ABNORMAL LFTS: ICD-10-CM

## 2023-02-02 PROCEDURE — 99395 PREV VISIT EST AGE 18-39: CPT | Performed by: NURSE PRACTITIONER

## 2023-02-02 ASSESSMENT — PATIENT HEALTH QUESTIONNAIRE - PHQ9: CLINICAL INTERPRETATION OF PHQ2 SCORE: 0

## 2023-02-02 NOTE — PROGRESS NOTES
Subjective:     CC:   Chief Complaint   Patient presents with    Annual Exam     Preventative     HPI:   Mattie Alatorre is a 37 y.o. female who presents for Routine Preventative Exam    Patient has GYN provider: No   Last Pap Smear: 2021  H/O Abnormal Pap: No  Last Mammogram: n.a  Last Bone Density Test: n/a  Last Colorectal Cancer Screening: n/a  Last Tdap: 2017  Received HPV series: Patient does not recall    Up to date on Eye Exam:  Yes  Up to date on Dental Cleanings:  Yes  Followed by Dermatology:  No     Exercise: some exercise-3-4 days a week.   Diet: regular diet      Patient's last menstrual period was 2023 (exact date).  Hx STDs: No  Birth control: none  Menses every month with 28 days with moderate bleeding.  She tells me that her menstrual cycles can last 8 to 10 days with clotting, heavy bleeding.  Reports mild cramping and does not take OTC analgesics for cramps.  No significant bloating/fluid retention, pelvic pain, or dyspareunia. No abnormal vaginal discharge.  No breast tenderness, mass, nipple discharge, changes in size or contour, or abnormal cyclic discomfort.    OB History    Para Term  AB Living   4 0 0 0 0 4   SAB IAB Ectopic Molar Multiple Live Births   0 0 0 0 0 0      She  reports being sexually active and has had partner(s) who are male. She reports using the following method of birth control/protection: None.    She  has a past medical history of Migraines.  She  has a past surgical history that includes pterygium excision (10/3/2014); pr ercp,diagnostic (N/A, 2020); and gregory by laparoscopy (N/A, 2020).    Family History   Problem Relation Age of Onset    Diabetes Father      Social History     Tobacco Use    Smoking status: Never    Smokeless tobacco: Never   Vaping Use    Vaping Use: Never used   Substance Use Topics    Alcohol use: No    Drug use: No       Patient Active Problem List    Diagnosis Date Noted    Prolonged menstrual cycle  "02/02/2023    Breast pain, left 04/24/2022    Obesity (BMI 30-39.9) 12/11/2020    Abnormal LFTs 01/19/2020     No current outpatient medications on file.     No current facility-administered medications for this visit.     No Known Allergies    Review of Systems heavy and prolonged menstrual cycles.,  Weight gain  Constitutional: Negative for fever, chills and malaise/fatigue.   HENT: Negative for congestion.    Eyes: Negative for pain.   Respiratory: Negative for cough and shortness of breath.    Cardiovascular: Negative for chest pain and leg swelling.   Gastrointestinal: Negative for nausea, vomiting, abdominal pain and diarrhea.   Genitourinary: Negative for dysuria and hematuria.   Skin: Negative for rash.   Neurological: Negative for dizziness, focal weakness and headaches.   Endo/Heme/Allergies: Does not bruise/bleed easily.   Psychiatric/Behavioral: Negative for depression.  The patient is not nervous/anxious.      Objective:   /60 (BP Location: Right arm, Patient Position: Sitting, BP Cuff Size: Adult)   Pulse 72   Temp 36.8 °C (98.3 °F) (Temporal)   Ht 1.525 m (5' 0.05\")   Wt 79.4 kg (175 lb)   LMP 01/20/2023 (Exact Date)   SpO2 92%   BMI 34.12 kg/m²     Wt Readings from Last 4 Encounters:   02/02/23 79.4 kg (175 lb)   12/21/22 81.6 kg (180 lb)   04/21/22 78.9 kg (174 lb)   05/28/21 75.8 kg (167 lb)       Physical Exam:  Constitutional: Well-developed and well-nourished. Not diaphoretic. No distress.   Skin: Skin is warm and dry. No rash noted.  Head: Atraumatic without lesions.  Eyes: Conjunctivae and extraocular motions are normal. Pupils are equal, round, and reactive to light. No scleral icterus.   Ears:  External ears unremarkable. Tympanic membranes clear and intact.  Nose: Nares patent. Septum midline. Turbinates without erythema nor edema. No discharge.   Mouth/Throat: Tongue normal. Oropharynx is clear and moist. Posterior pharynx without erythema or exudates.  Neck: Supple, trachea " midline. Normal range of motion. No thyromegaly present. No lymphadenopathy--cervical or supraclavicular.  Cardiovascular: Regular rate and rhythm, S1 and S2 without murmur, rubs, or gallops.    Respiratory: Effort normal. Clear to auscultation throughout. No adventitious sounds.   Extremities: No cyanosis, clubbing, erythema, nor edema.   Musculoskeletal: All major joints AROM full in all directions without pain.  Neurological: Alert and oriented x 3. Grossly non-focal. Strength and sensation grossly intact.   Psychiatric:  Behavior, mood, and affect are appropriate.    Assessment and Plan:     1. Routine general medical examination at a health care facility  Relatively healthy adult 37-year-old female. Up to date on recommended screenings.   Continue to recommend avoiding illicit drugs, tobacco products, limit EtOH use.  Recommend heart healthy diet, exercise least 3-5 times weekly.  Follow-up annually for routine preventative exam.    2. Obesity (BMI 30-39.9)  - TSH; Future  - Comp Metabolic Panel; Future  - Lipid Profile; Future    3. Abnormal LFTs  Did not complete previously ordered blood work.  -Update CMP follow-up to review    4. Prolonged menstrual cycle  - US-PELVIC COMPLETE (TRANSABDOMINAL/TRANSVAGINAL) (COMBO); Future  - FERRITIN; Future  - CBC WITHOUT DIFFERENTIAL; Future    5. Encounter for sterilization education  She is interested in having her tubes tied.  She will follow-up at next office visit if she needs referral to her gynecologist.    6. Screening for cardiovascular condition  - Lipid Profile; Future    Health maintenance:   Labs per orders-  Immunizations per orders-  Patient counseled about skin care, diet, supplements, and exercise.  Discussed  breast self exam, adequate intake of calcium and vitamin D, diet and exercise, sun screen    Follow-up: Return in about 4 weeks (around 3/2/2023) for Lab results.    Madeline GONSALEZ

## 2023-02-10 LAB
ALBUMIN SERPL-MCNC: 3.4 G/DL (ref 3.8–4.8)
ALBUMIN/GLOB SERPL: 1.5 {RATIO} (ref 1.2–2.2)
ALP SERPL-CCNC: 61 IU/L (ref 44–121)
ALT SERPL-CCNC: 23 IU/L (ref 0–32)
AST SERPL-CCNC: 21 IU/L (ref 0–40)
BILIRUB SERPL-MCNC: 0.6 MG/DL (ref 0–1.2)
BUN SERPL-MCNC: 9 MG/DL (ref 6–20)
BUN/CREAT SERPL: 14 (ref 9–23)
CALCIUM SERPL-MCNC: 8.6 MG/DL (ref 8.7–10.2)
CHLORIDE SERPL-SCNC: 105 MMOL/L (ref 96–106)
CHOLEST SERPL-MCNC: 188 MG/DL (ref 100–199)
CO2 SERPL-SCNC: 23 MMOL/L (ref 20–29)
CREAT SERPL-MCNC: 0.66 MG/DL (ref 0.57–1)
EGFRCR SERPLBLD CKD-EPI 2021: 116 ML/MIN/1.73
ERYTHROCYTE [DISTWIDTH] IN BLOOD BY AUTOMATED COUNT: 12.4 % (ref 11.7–15.4)
FERRITIN SERPL-MCNC: 26 NG/ML (ref 15–150)
GLOBULIN SER CALC-MCNC: 2.2 G/DL (ref 1.5–4.5)
GLUCOSE SERPL-MCNC: 87 MG/DL (ref 70–99)
HCT VFR BLD AUTO: 40.6 % (ref 34–46.6)
HDLC SERPL-MCNC: 41 MG/DL
HGB BLD-MCNC: 13.5 G/DL (ref 11.1–15.9)
LABORATORY COMMENT REPORT: ABNORMAL
LDLC SERPL CALC-MCNC: 130 MG/DL (ref 0–99)
MCH RBC QN AUTO: 29.1 PG (ref 26.6–33)
MCHC RBC AUTO-ENTMCNC: 33.3 G/DL (ref 31.5–35.7)
MCV RBC AUTO: 88 FL (ref 79–97)
NRBC BLD AUTO-RTO: NORMAL %
PLATELET # BLD AUTO: 281 X10E3/UL (ref 150–450)
POTASSIUM SERPL-SCNC: 4.4 MMOL/L (ref 3.5–5.2)
PROT SERPL-MCNC: 5.6 G/DL (ref 6–8.5)
RBC # BLD AUTO: 4.64 X10E6/UL (ref 3.77–5.28)
SODIUM SERPL-SCNC: 137 MMOL/L (ref 134–144)
TRIGL SERPL-MCNC: 91 MG/DL (ref 0–149)
TSH SERPL DL<=0.005 MIU/L-ACNC: 2.53 UIU/ML (ref 0.45–4.5)
VLDLC SERPL CALC-MCNC: 17 MG/DL (ref 5–40)
WBC # BLD AUTO: 5.7 X10E3/UL (ref 3.4–10.8)

## 2023-02-21 ENCOUNTER — TELEPHONE (OUTPATIENT)
Dept: MEDICAL GROUP | Facility: MEDICAL CENTER | Age: 38
End: 2023-02-21
Payer: COMMERCIAL

## 2023-02-21 NOTE — TELEPHONE ENCOUNTER
Pt called and lvm stating that she would like to know what her lab results were. Pt had them done on 2/8/2023.

## 2023-03-02 ENCOUNTER — HOSPITAL ENCOUNTER (OUTPATIENT)
Dept: RADIOLOGY | Facility: MEDICAL CENTER | Age: 38
End: 2023-03-02
Attending: NURSE PRACTITIONER
Payer: COMMERCIAL

## 2023-03-02 DIAGNOSIS — E83.51 HYPOCALCEMIA: ICD-10-CM

## 2023-03-02 DIAGNOSIS — N92.1 PROLONGED MENSTRUAL CYCLE: ICD-10-CM

## 2023-03-02 PROCEDURE — 76830 TRANSVAGINAL US NON-OB: CPT

## 2023-03-23 ENCOUNTER — TELEMEDICINE (OUTPATIENT)
Dept: MEDICAL GROUP | Facility: MEDICAL CENTER | Age: 38
End: 2023-03-23
Payer: COMMERCIAL

## 2023-03-23 VITALS — BODY MASS INDEX: 34.95 KG/M2 | HEIGHT: 60 IN | WEIGHT: 178 LBS

## 2023-03-23 DIAGNOSIS — E66.9 OBESITY (BMI 30-39.9): ICD-10-CM

## 2023-03-23 DIAGNOSIS — Z71.3 ENCOUNTER FOR WEIGHT LOSS COUNSELING: ICD-10-CM

## 2023-03-23 DIAGNOSIS — E83.51 HYPOCALCEMIA: ICD-10-CM

## 2023-03-23 DIAGNOSIS — N92.1 PROLONGED MENSTRUAL CYCLE: ICD-10-CM

## 2023-03-23 DIAGNOSIS — R79.0 LOW FERRITIN: ICD-10-CM

## 2023-03-23 DIAGNOSIS — R79.89 ABNORMAL LFTS: ICD-10-CM

## 2023-03-23 PROCEDURE — 99214 OFFICE O/P EST MOD 30 MIN: CPT | Mod: 95 | Performed by: NURSE PRACTITIONER

## 2023-03-23 RX ORDER — PHENTERMINE HYDROCHLORIDE 15 MG/1
15 CAPSULE ORAL EVERY MORNING
Qty: 30 CAPSULE | Refills: 0 | Status: SHIPPED | OUTPATIENT
Start: 2023-03-23 | End: 2023-04-22

## 2023-03-23 ASSESSMENT — FIBROSIS 4 INDEX: FIB4 SCORE: 0.58

## 2023-03-23 NOTE — PROGRESS NOTES
"Virtual Visit: Established Patient   This visit was conducted via Zoom using secure and encrypted videoconferencing technology.   The patient was in their home in the [place].    The patient's identity was confirmed and verbal consent was obtained for this virtual visit.    Subjective:   CC:   Chief Complaint   Patient presents with    Lab Results     DOS: 2/8/2023    Weight Check     Initial Counseling     Mattie Alatorre is a 37 y.o. female presenting for evaluation and management of:    Lab review    Would like to start on Phentermine.   Has tolerated in the past.     Have reviewed her pelvic ultrasound.  No abnormal findings.  Working [employer] and is hopeful to have a change in her schedule.  She believes that this does contribute to her irregular menstrual cycles.    ROS   See above    Current medicines (including changes today)  Current Outpatient Medications   Medication Sig Dispense Refill    phentermine 15 MG capsule Take 1 Capsule by mouth every morning for 30 days. 30 Capsule 0     No current facility-administered medications for this visit.       Patient Active Problem List    Diagnosis Date Noted    Prolonged menstrual cycle 02/02/2023    Breast pain, left 04/24/2022    Obesity (BMI 30-39.9) 12/11/2020    Abnormal LFTs 01/19/2020        Objective:   Ht 1.525 m (5' 0.05\")   Wt 80.7 kg (178 lb)   BMI 34.71 kg/m²     Physical Exam:  Constitutional: Alert, no distress, well-groomed.  Skin: No rashes in visible areas.  Eye: Round. Conjunctiva clear, lids normal. No icterus.   ENMT: Lips pink without lesions, good dentition, moist mucous membranes. Phonation normal.  Neck: No masses, no thyromegaly. Moves freely without pain.  Respiratory: Unlabored respiratory effort, no cough or audible wheeze  Psych: Alert and oriented x3, normal affect and mood.     Assessment and Plan:   The following treatment plan was discussed:     1. Hypocalcemia  Present on most recent blood work.  Pending recheck " of labs.  She will complete these this week.    2. Prolonged menstrual cycle  Ongoing problem, normal vaginal ultrasound.  Monitor.    3. Abnormal LFTs  LFTs have returned to normal.    4. Low ferritin  Borderline low ferritin.  Patient does have irregular and prolonged menstrual cycles however her pelvic ultrasound was normal.  Recommend at this time to help keep iron levels stable recommend OTC ferrous sulfate twice weekly.  Caution regarding potential for constipation and darker stools.    5. Encounter for weight loss counseling  6. Obesity (BMI 30-39.9)  Chronic.  Would like to start on phentermine.  Has tolerated well in the past.  Have discussed common side effects and ADRs.   checked.  No consistencies.  750 mg once daily increase hydration, reduce caffeine.  Follow-up 1 month.  She is able to check her blood pressure at home.  - phentermine 15 MG capsule; Take 1 Capsule by mouth every morning for 30 days.  Dispense: 30 Capsule; Refill: 0      Follow-up: Return in about 4 weeks (around 4/20/2023) for Lab results, Med Check.

## 2023-04-01 LAB
25(OH)D3+25(OH)D2 SERPL-MCNC: 12.7 NG/ML (ref 30–100)
BUN SERPL-MCNC: 10 MG/DL (ref 6–20)
BUN/CREAT SERPL: 16 (ref 9–23)
CALCIUM SERPL-MCNC: 8.7 MG/DL (ref 8.7–10.2)
CHLORIDE SERPL-SCNC: 106 MMOL/L (ref 96–106)
CO2 SERPL-SCNC: 24 MMOL/L (ref 20–29)
CREAT SERPL-MCNC: 0.63 MG/DL (ref 0.57–1)
EGFRCR SERPLBLD CKD-EPI 2021: 117 ML/MIN/1.73
GLUCOSE SERPL-MCNC: 93 MG/DL (ref 70–99)
POTASSIUM SERPL-SCNC: 4.1 MMOL/L (ref 3.5–5.2)
PTH-INTACT SERPL-MCNC: 46 PG/ML (ref 15–65)
SODIUM SERPL-SCNC: 140 MMOL/L (ref 134–144)

## 2023-04-12 DIAGNOSIS — E83.51 HYPOCALCEMIA: ICD-10-CM

## 2023-04-12 DIAGNOSIS — E55.9 VITAMIN D DEFICIENCY: ICD-10-CM

## 2023-04-12 RX ORDER — ERGOCALCIFEROL 1.25 MG/1
50000 CAPSULE ORAL
Qty: 12 CAPSULE | Refills: 1 | Status: SHIPPED | OUTPATIENT
Start: 2023-04-12

## 2023-04-12 NOTE — RESULT ENCOUNTER NOTE
Please call patient to notify her that her blood work has been reviewed.    -She is low on her vitamin D-I have sent a prescription for 6 months of high-dose vitamin D that she will take once weekly.  Please also have her start taking vitamin D3 over-the-counter 2000 IU daily.  Calcium is trending up-I will likely correct itself once her vitamin D has improved

## 2023-04-27 ENCOUNTER — OFFICE VISIT (OUTPATIENT)
Dept: MEDICAL GROUP | Facility: MEDICAL CENTER | Age: 38
End: 2023-04-27
Payer: COMMERCIAL

## 2023-04-27 VITALS
OXYGEN SATURATION: 97 % | HEIGHT: 60 IN | DIASTOLIC BLOOD PRESSURE: 64 MMHG | SYSTOLIC BLOOD PRESSURE: 108 MMHG | HEART RATE: 72 BPM | TEMPERATURE: 97.5 F | BODY MASS INDEX: 34.95 KG/M2 | WEIGHT: 178 LBS

## 2023-04-27 DIAGNOSIS — N94.3 PREMENSTRUAL SYNDROME: ICD-10-CM

## 2023-04-27 DIAGNOSIS — E55.9 VITAMIN D DEFICIENCY: ICD-10-CM

## 2023-04-27 DIAGNOSIS — E66.9 OBESITY (BMI 30-39.9): ICD-10-CM

## 2023-04-27 DIAGNOSIS — Z71.3 ENCOUNTER FOR WEIGHT LOSS COUNSELING: ICD-10-CM

## 2023-04-27 DIAGNOSIS — N92.1 PROLONGED MENSTRUAL CYCLE: ICD-10-CM

## 2023-04-27 PROCEDURE — 99214 OFFICE O/P EST MOD 30 MIN: CPT | Performed by: NURSE PRACTITIONER

## 2023-04-27 RX ORDER — PHENTERMINE HYDROCHLORIDE 37.5 MG/1
37.5 CAPSULE ORAL EVERY MORNING
Qty: 30 CAPSULE | Refills: 0 | Status: SHIPPED | OUTPATIENT
Start: 2023-04-27 | End: 2023-05-27

## 2023-04-27 ASSESSMENT — FIBROSIS 4 INDEX: FIB4 SCORE: 0.58

## 2023-04-27 NOTE — PROGRESS NOTES
"Chief Complaint   Patient presents with    Lab Results     DOS: 3/30/2023       Subjective:     HPI:     Mattie Garcia is a 37 y.o. female here to discuss the following:    Patient presents to review labs and have a refill on the phentermine.    Problem   Vitamin D Deficiency    Suboptimal.  Started on OTC and high-dose vitamin D     Premenstrual Syndrome    Having some feelings of irritability, short tempered, borja, emotional about a week before her menstrual cycle.  Yoga helpful.     Prolonged Menstrual Cycle    Normal TSH, normal pelvic ultrasound          ROS: : see above        Current Outpatient Medications:     phentermine 37.5 MG capsule, Take 1 Capsule by mouth every morning for 30 days., Disp: 30 Capsule, Rfl: 0    ergocalciferol (DRISDOL) 27305 UNIT capsule, Take 1 Capsule by mouth every 7 days., Disp: 12 Capsule, Rfl: 1    No Known Allergies    Objective:     Vitals: /64 (BP Location: Right arm, Patient Position: Sitting, BP Cuff Size: Adult)   Pulse 72   Temp 36.4 °C (97.5 °F) (Temporal)   Ht 1.525 m (5' 0.05\")   Wt 80.7 kg (178 lb)   SpO2 97%   BMI 34.71 kg/m²    General: Alert, pleasant, NAD  HEENT: Normocephalic.  Neck supple.   Respiratory: no distress, no audible wheezing, RR -WNL  Skin: Warm, dry, no rashes.  Extremities: No leg edema. No discoloration  Neurological: No tremors  Psych:  Affect/mood is normal, judgement is good, memory is intact, grooming is appropriate.    Assessment/Plan:      1. Vitamin D deficiency  Chronic, not well controlled at this time.  Continue high-dose and OTC vitamin D3.    2. Encounter for weight loss counseling  3. Obesity (BMI 30-39.9)  Chronic problem for the patient.  Tolerated low-dose phentermine without any common side effects or ADRs.  She would like to continue.  Have sent a prescription for 37.5 mg to pharmacy.   checked.  No consistencies.  Continue adequate hydration and have again reviewed common side effects medication.  " Follow-up 1 month  - phentermine 37.5 MG capsule; Take 1 Capsule by mouth every morning for 30 days.  Dispense: 30 Capsule; Refill: 0    4. Prolonged menstrual cycle  5. Premenstrual syndrome  Normal TSH, normal pelvic ultrasound.  Recommend to keep track of her menstrual cycle. We have discussed possible starting on oral contraceptives to assist with regulating her menstrual cycle.  She would like to hold on this for now.  Trial Jason, encouraged self care.     Return in about 4 weeks (around 5/25/2023) for Follow-up vitamin, Med Check.          Madeline MARTE.

## 2023-12-29 ENCOUNTER — OFFICE VISIT (OUTPATIENT)
Dept: URGENT CARE | Facility: CLINIC | Age: 38
End: 2023-12-29
Payer: COMMERCIAL

## 2023-12-29 ENCOUNTER — HOSPITAL ENCOUNTER (OUTPATIENT)
Facility: MEDICAL CENTER | Age: 38
End: 2023-12-29
Attending: STUDENT IN AN ORGANIZED HEALTH CARE EDUCATION/TRAINING PROGRAM
Payer: COMMERCIAL

## 2023-12-29 VITALS
HEIGHT: 60 IN | BODY MASS INDEX: 34.95 KG/M2 | RESPIRATION RATE: 18 BRPM | DIASTOLIC BLOOD PRESSURE: 74 MMHG | HEART RATE: 68 BPM | TEMPERATURE: 97.4 F | SYSTOLIC BLOOD PRESSURE: 114 MMHG | OXYGEN SATURATION: 99 % | WEIGHT: 178 LBS

## 2023-12-29 DIAGNOSIS — J02.9 PHARYNGITIS, UNSPECIFIED ETIOLOGY: ICD-10-CM

## 2023-12-29 LAB — S PYO DNA SPEC NAA+PROBE: NOT DETECTED

## 2023-12-29 PROCEDURE — 3074F SYST BP LT 130 MM HG: CPT | Performed by: STUDENT IN AN ORGANIZED HEALTH CARE EDUCATION/TRAINING PROGRAM

## 2023-12-29 PROCEDURE — 87651 STREP A DNA AMP PROBE: CPT | Performed by: STUDENT IN AN ORGANIZED HEALTH CARE EDUCATION/TRAINING PROGRAM

## 2023-12-29 PROCEDURE — 87070 CULTURE OTHR SPECIMN AEROBIC: CPT

## 2023-12-29 PROCEDURE — 3078F DIAST BP <80 MM HG: CPT | Performed by: STUDENT IN AN ORGANIZED HEALTH CARE EDUCATION/TRAINING PROGRAM

## 2023-12-29 PROCEDURE — 99213 OFFICE O/P EST LOW 20 MIN: CPT | Performed by: STUDENT IN AN ORGANIZED HEALTH CARE EDUCATION/TRAINING PROGRAM

## 2023-12-29 RX ORDER — DEXAMETHASONE SODIUM PHOSPHATE 10 MG/ML
10 INJECTION INTRAMUSCULAR; INTRAVENOUS ONCE
Status: COMPLETED | OUTPATIENT
Start: 2023-12-29 | End: 2023-12-29

## 2023-12-29 RX ADMIN — DEXAMETHASONE SODIUM PHOSPHATE 10 MG: 10 INJECTION INTRAMUSCULAR; INTRAVENOUS at 12:16

## 2023-12-29 ASSESSMENT — FIBROSIS 4 INDEX: FIB4 SCORE: 0.59

## 2023-12-29 NOTE — PROGRESS NOTES
Subjective:   CHIEF COMPLAINT  Chief Complaint   Patient presents with    Sore Throat     X 1 month, off and on        HPI  Mattie Garcia is a 38 y.o. female who presents with a chief complaint of a sore throat x 1 month.  Says this discomfort comes and goes.  No specific triggers or aggravating factors.  Says she has been taking ibuprofen and Advil which helps but only provide transient relief of symptoms.  Sore throat is typically worse in the afternoon.  Initially was experiencing a runny nose, watery eyes and cough however those symptoms have resolved.  Currently just having a sore throat.  No fevers or sinus congestion.  No history of tobacco abuse.  No history of reflux.  Does not use any steroid inhalers.  No concerns for STIs.    REVIEW OF SYSTEMS  General: no fever or chills  GI: no nausea or vomiting  See HPI for further details.    PAST MEDICAL HISTORY  Patient Active Problem List    Diagnosis Date Noted    Vitamin D deficiency 04/27/2023    Premenstrual syndrome 04/27/2023    Prolonged menstrual cycle 02/02/2023    Breast pain, left 04/24/2022    Obesity (BMI 30-39.9) 12/11/2020    Abnormal LFTs 01/19/2020       SURGICAL HISTORY   has a past surgical history that includes pterygium excision (10/3/2014); ercp,diagnostic (N/A, 1/21/2020); and gregory by laparoscopy (N/A, 1/22/2020).    ALLERGIES  No Known Allergies    CURRENT MEDICATIONS  Home Medications       Reviewed by Eldon Wall D.O. (Physician) on 12/29/23 at 1043  Med List Status: <None>     Medication Last Dose Status   dexamethasone (Decadron) injection (check route below) 10 mg  Active   ergocalciferol (DRISDOL) 60123 UNIT capsule Taking Active                    SOCIAL HISTORY  Social History     Tobacco Use    Smoking status: Never    Smokeless tobacco: Never   Vaping Use    Vaping Use: Never used   Substance and Sexual Activity    Alcohol use: No    Drug use: No    Sexual activity: Yes     Partners: Male     Birth  control/protection: None       FAMILY HISTORY  Family History   Problem Relation Age of Onset    Diabetes Father           Objective:   PHYSICAL EXAM  VITAL SIGNS: /74   Pulse 68   Temp 36.3 °C (97.4 °F)   Resp 18   Ht 1.524 m (5')   Wt 80.7 kg (178 lb)   SpO2 99%   BMI 34.76 kg/m²     Gen: no acute distress, normal voice  Skin: dry, intact, moist mucosal membranes  Eyes: No conjunctival injection b/l  Neck: Normal range of motion. No meningeal signs.   ENT: No oropharyngeal erythema or exudates. Uvula midline. TMs clear and intact b/l w/o bulging, erythema or effusion. No lymphadenopathy.  Lungs: No increased work of breathing.  CTAB w/ symmetric expansion  CV: RRR w/o murmurs or clicks  Psych: normal affect, normal judgement, alert, awake    Assessment/Plan:     1. Pharyngitis, unspecified etiology  POCT CEPHEID GROUP A STREP - PCR    CULTURE THROAT    dexamethasone (Decadron) injection (check route below) 10 mg      Examination unremarkable.  POC strep test was negative.  -Decadron 10 mg p.m. x 1 given in clinic  - Order throat culture  -Return to urgent care any new/worsening symptoms or further questions or concerns.  Patient understood everything discussed.  All questions were answered.    Differential diagnosis and supportive care discussed. Follow-up as needed if symptoms worsen or fail to improve to PCP, Urgent care or Emergency Room.    Please note that this dictation was created using voice recognition software. I have made a reasonable attempt to correct obvious errors, but I expect that there are errors of grammar and possibly content that I did not discover before finalizing the note.

## 2024-01-01 LAB
BACTERIA SPEC RESP CULT: NORMAL
SIGNIFICANT IND 70042: NORMAL
SITE SITE: NORMAL
SOURCE SOURCE: NORMAL

## 2024-03-21 ENCOUNTER — OFFICE VISIT (OUTPATIENT)
Dept: MEDICAL GROUP | Facility: MEDICAL CENTER | Age: 39
End: 2024-03-21
Payer: COMMERCIAL

## 2024-03-21 ENCOUNTER — HOSPITAL ENCOUNTER (OUTPATIENT)
Facility: MEDICAL CENTER | Age: 39
End: 2024-03-21
Attending: NURSE PRACTITIONER
Payer: COMMERCIAL

## 2024-03-21 VITALS
HEART RATE: 62 BPM | WEIGHT: 182 LBS | SYSTOLIC BLOOD PRESSURE: 114 MMHG | DIASTOLIC BLOOD PRESSURE: 70 MMHG | OXYGEN SATURATION: 98 % | HEIGHT: 60 IN | BODY MASS INDEX: 35.73 KG/M2 | TEMPERATURE: 97 F | RESPIRATION RATE: 16 BRPM

## 2024-03-21 DIAGNOSIS — Z11.8 SCREENING FOR CHLAMYDIAL DISEASE: ICD-10-CM

## 2024-03-21 DIAGNOSIS — R10.12 LEFT UPPER QUADRANT ABDOMINAL PAIN: ICD-10-CM

## 2024-03-21 DIAGNOSIS — Z11.4 SCREENING FOR HIV (HUMAN IMMUNODEFICIENCY VIRUS): ICD-10-CM

## 2024-03-21 DIAGNOSIS — Z01.419 WELL WOMAN EXAM WITH ROUTINE GYNECOLOGICAL EXAM: ICD-10-CM

## 2024-03-21 DIAGNOSIS — E55.9 VITAMIN D DEFICIENCY: ICD-10-CM

## 2024-03-21 DIAGNOSIS — Z23 NEED FOR VACCINATION: ICD-10-CM

## 2024-03-21 DIAGNOSIS — E78.5 DYSLIPIDEMIA: ICD-10-CM

## 2024-03-21 DIAGNOSIS — Z11.51 SCREENING FOR HPV (HUMAN PAPILLOMAVIRUS): ICD-10-CM

## 2024-03-21 DIAGNOSIS — Z12.4 SCREENING FOR MALIGNANT NEOPLASM OF CERVIX: ICD-10-CM

## 2024-03-21 DIAGNOSIS — Z01.84 IMMUNITY STATUS TESTING: ICD-10-CM

## 2024-03-21 DIAGNOSIS — R79.0 LOW FERRITIN: ICD-10-CM

## 2024-03-21 DIAGNOSIS — E66.9 OBESITY (BMI 30-39.9): ICD-10-CM

## 2024-03-21 PROCEDURE — 87591 N.GONORRHOEAE DNA AMP PROB: CPT

## 2024-03-21 PROCEDURE — 87491 CHLMYD TRACH DNA AMP PROBE: CPT

## 2024-03-21 PROCEDURE — 3078F DIAST BP <80 MM HG: CPT | Performed by: NURSE PRACTITIONER

## 2024-03-21 PROCEDURE — 90746 HEPB VACCINE 3 DOSE ADULT IM: CPT | Performed by: NURSE PRACTITIONER

## 2024-03-21 PROCEDURE — 90632 HEPA VACCINE ADULT IM: CPT | Performed by: NURSE PRACTITIONER

## 2024-03-21 PROCEDURE — 3074F SYST BP LT 130 MM HG: CPT | Performed by: NURSE PRACTITIONER

## 2024-03-21 PROCEDURE — 90472 IMMUNIZATION ADMIN EACH ADD: CPT | Performed by: NURSE PRACTITIONER

## 2024-03-21 PROCEDURE — 99395 PREV VISIT EST AGE 18-39: CPT | Mod: 25 | Performed by: NURSE PRACTITIONER

## 2024-03-21 PROCEDURE — 90651 9VHPV VACCINE 2/3 DOSE IM: CPT | Performed by: NURSE PRACTITIONER

## 2024-03-21 PROCEDURE — 90471 IMMUNIZATION ADMIN: CPT | Performed by: NURSE PRACTITIONER

## 2024-03-21 PROCEDURE — 87624 HPV HI-RISK TYP POOLED RSLT: CPT

## 2024-03-21 PROCEDURE — 88175 CYTOPATH C/V AUTO FLUID REDO: CPT

## 2024-03-21 ASSESSMENT — FIBROSIS 4 INDEX: FIB4 SCORE: 0.59

## 2024-03-21 ASSESSMENT — PATIENT HEALTH QUESTIONNAIRE - PHQ9: CLINICAL INTERPRETATION OF PHQ2 SCORE: 0

## 2024-03-21 NOTE — PROGRESS NOTES
Subjective:     CC:   Chief Complaint   Patient presents with    Annual Exam       HPI:   Mattie Garcia is a 38 y.o. female who presents for Routine Preventative Exam    Last Pap Smear:   H/O Abnormal Pap: No  Last Mammogram: 2020  Last Bone Density Test: n/a  Last Colorectal Cancer Screening: n/a  Last Tdap: 2017  Received HPV series: has received 1 dose of Gardasil     Up to date on Eye Exam:  Yes  Up to date on Dental Cleanings:  Yes    Exercise: yes, trying     No LMP recorded.  Hx STDs: No  Birth control: none  She reports in a safe relationship.   No significant bloating/fluid retention, pelvic pain, or dyspareunia. No abnormal vaginal discharge.  No breast tenderness, mass, nipple discharge, changes in size or contour, or abnormal cyclic discomfort.    OB History    Para Term  AB Living   4 0 0 0 0 4   SAB IAB Ectopic Molar Multiple Live Births   0 0 0 0 0 0      She  reports being sexually active and has had partner(s) who are male. She reports using the following method of birth control/protection: None.    She  has a past medical history of Migraines.  She  has a past surgical history that includes pterygium excision (10/3/2014); pr ercp,diagnostic (N/A, 2020); and gregory by laparoscopy (N/A, 2020).    Family History   Problem Relation Age of Onset    Diabetes Father      Social History     Tobacco Use    Smoking status: Never    Smokeless tobacco: Never   Vaping Use    Vaping Use: Never used   Substance Use Topics    Alcohol use: No    Drug use: No       Patient Active Problem List    Diagnosis Date Noted    Vitamin D deficiency 2023    Premenstrual syndrome 2023    Prolonged menstrual cycle 2023    Breast pain, left 2022    Obesity (BMI 30-39.9) 2020    Abnormal LFTs 2020     Current Outpatient Medications   Medication Sig Dispense Refill    ergocalciferol (DRISDOL) 93871 UNIT capsule Take 1 Capsule by mouth every 7  days. 12 Capsule 1     No current facility-administered medications for this visit.     No Known Allergies    Review of Systems. Feeling inflammation/ discomfort, on left upper abdomen. If laying on left side of abdomen feels something poking. No n/v/d. No heart burn. Ongoing for about one year. No triggers.   Constitutional: Negative for fever, chills and malaise/fatigue.   HENT: Negative for congestion.    Eyes: Negative for pain.   Respiratory: Negative for cough and shortness of breath.    Cardiovascular: Negative for chest pain and leg swelling.   Gastrointestinal: Negative for nausea, vomiting, abdominal pain and diarrhea.   Genitourinary: Negative for dysuria and hematuria.   Skin: Negative for rash.   Neurological: Negative for dizziness, focal weakness and headaches.   Endo/Heme/Allergies: Does not bruise/bleed easily.   Psychiatric/Behavioral: Negative for depression.  The patient is not nervous/anxious.      Objective:   /70   Pulse 62   Temp 36.1 °C (97 °F)   Resp 16   Ht 1.524 m (5')   Wt 82.6 kg (182 lb)   SpO2 98%   BMI 35.54 kg/m²     Wt Readings from Last 4 Encounters:   03/21/24 82.6 kg (182 lb)   12/29/23 80.7 kg (178 lb)   04/27/23 80.7 kg (178 lb)   03/23/23 80.7 kg (178 lb)       A chaperone was offered to the patient during today's exam. Patient declined chaperone.    Physical Exam:  Constitutional: Well-developed and well-nourished. Not diaphoretic. No distress.   Skin: Skin is warm and dry. No rash noted.  Head: Atraumatic without lesions.  Eyes: Conjunctivae and extraocular motions are normal. Pupils are equal, round, and reactive to light. No scleral icterus.   Ears:  External ears unremarkable.   Nose: Nares patent. Septum midline.   Mouth/Throat: Tongue normal. Oropharynx is clear and moist. Posterior pharynx without erythema or exudates.  Neck: Supple, trachea midline. Normal range of motion. No thyromegaly present. No lymphadenopathy--cervical or  supraclavicular.  Cardiovascular: Regular rate and rhythm, S1 and S2 without murmur, rubs, or gallops.    Respiratory: Effort normal. Clear to auscultation throughout. No adventitious sounds.   Breast: Breasts examined seated and supine. No skin changes, peau d'orange, + inverted left nipple No discharge. No axillary or supraclavicular adenopathy. No masses or nodularity palpable.  Abdomen: Soft, non tender, and without distention. Active bowel sounds in all four quadrants.   : Perineum and external genitalia normal without rash. Vagina with normal and physiologic, scant, and clear discharge. Cervix without visible lesions or discharge. Bimanual exam without adnexal masses or cervical motion tenderness.  Extremities: No cyanosis, clubbing, erythema, nor edema. Distal pulses intact and symmetric.   Musculoskeletal: All major joints AROM full in all directions without pain.  Neurological: Alert and oriented x 3. Grossly non-focal.   Psychiatric:  Behavior, mood, and affect are appropriate.    Assessment and Plan:     1. Well woman exam with routine gynecological exam  - THINPREP PAP W/HPV AND CTNG; Future    2. Screening for HPV (human papillomavirus)  - THINPREP PAP W/HPV AND CTNG; Future    3. Screening for malignant neoplasm of cervix  - THINPREP PAP W/HPV AND CTNG; Future    4. Screening for chlamydial disease  - THINPREP PAP W/HPV AND CTNG; Future    5. Left upper quadrant abdominal pain  - US-ABDOMEN COMPLETE SURVEY; Future  - LIPASE; Future    6. Obesity (BMI 30-39.9)  - Patient identified as having weight management issue.  Appropriate orders and counseling given.  - TSH; Future  - Comp Metabolic Panel; Future  - Lipid Profile; Future  - Referral to Pharmacotherapy Service    7. Immunity status testing  - VARICELLA ZOSTER IGG AB; Future    8. Low ferritin  - FERRITIN; Future  - IRON/TOTAL IRON BIND; Future  - CBC WITHOUT DIFFERENTIAL; Future    9. Screening for HIV (human immunodeficiency virus)  - HIV AG/AB  COMBO ASSAY SCREENING; Future    10. Vitamin D deficiency  - VITAMIN D,25 HYDROXY (DEFICIENCY); Future    11. Dyslipidemia  - Lipid Profile; Future    12. Need for vaccination  - Hep B Adult 20+  - Hep A Adult 19+  - Gardasil 9      Health maintenance:  -Labs per orders-  -Immunizations per orders-  -Patient counseled about skin care, diet, supplements, and exercise, self care.   -Discussed  breast self exam, mammography screening, adequate intake of calcium and vitamin D, diet and exercise, colorectal cancer screening     Smoking: recommend complete avoidance of all tobacco products  Alcohol: recommend limiting consumption  Physical Activity: goal is 30 min of moderate activity 5 times a week  Weight Management and Nutrition: high vegetable, high protein diet like the Mediterranean diet, portion control, avoid excessive sugars.    I have placed the below orders and discussed them with an approved delegating provider.  The MA is performing the below orders under the direction of Dr. Mitchell    Follow-up: Return for follow up labs. .    Madeline GONSALEZ

## 2024-03-26 LAB
C TRACH RRNA CVX QL NAA+PROBE: NEGATIVE
CYTOLOGIST CVX/VAG CYTO: NORMAL
CYTOLOGY CVX/VAG DOC CYTO: NORMAL
CYTOLOGY CVX/VAG DOC THIN PREP: NORMAL
HPV I/H RISK 4 DNA CVX QL PROBE+SIG AMP: NEGATIVE
N GONORRHOEA RRNA CVX QL NAA+PROBE: NEGATIVE
NOTE NL11727A: NORMAL
OTHER STN SPEC: NORMAL
QC REVIEWED BY NL11722A: NORMAL
STAT OF ADQ CVX/VAG CYTO-IMP: NORMAL

## 2024-03-27 ENCOUNTER — TELEPHONE (OUTPATIENT)
Dept: HEALTH INFORMATION MANAGEMENT | Facility: OTHER | Age: 39
End: 2024-03-27
Payer: COMMERCIAL

## 2024-04-06 LAB
25(OH)D3+25(OH)D2 SERPL-MCNC: 14.8 NG/ML (ref 30–100)
ALBUMIN SERPL-MCNC: 4 G/DL (ref 3.9–4.9)
ALBUMIN/GLOB SERPL: 1.7 {RATIO} (ref 1.2–2.2)
ALP SERPL-CCNC: 74 IU/L (ref 44–121)
ALT SERPL-CCNC: 21 IU/L (ref 0–32)
AST SERPL-CCNC: 22 IU/L (ref 0–40)
BILIRUB SERPL-MCNC: <0.2 MG/DL (ref 0–1.2)
BUN SERPL-MCNC: 5 MG/DL (ref 6–20)
BUN/CREAT SERPL: 12 (ref 9–23)
CALCIUM SERPL-MCNC: 8.3 MG/DL (ref 8.7–10.2)
CHLORIDE SERPL-SCNC: 107 MMOL/L (ref 96–106)
CHOLEST SERPL-MCNC: 185 MG/DL (ref 100–199)
CO2 SERPL-SCNC: 21 MMOL/L (ref 20–29)
CREAT SERPL-MCNC: 0.42 MG/DL (ref 0.57–1)
EGFRCR SERPLBLD CKD-EPI 2021: 128 ML/MIN/1.73
ERYTHROCYTE [DISTWIDTH] IN BLOOD BY AUTOMATED COUNT: 12.2 % (ref 11.7–15.4)
FERRITIN SERPL-MCNC: 28 NG/ML (ref 15–150)
GLOBULIN SER CALC-MCNC: 2.4 G/DL (ref 1.5–4.5)
GLUCOSE SERPL-MCNC: 92 MG/DL (ref 70–99)
HCT VFR BLD AUTO: 42.5 % (ref 34–46.6)
HDLC SERPL-MCNC: 61 MG/DL
HGB BLD-MCNC: 14 G/DL (ref 11.1–15.9)
HIV 1+2 AB+HIV1 P24 AG SERPL QL IA: NON REACTIVE
IRON SATN MFR SERPL: 16 % (ref 15–55)
IRON SERPL-MCNC: 59 UG/DL (ref 27–159)
LABORATORY COMMENT REPORT: ABNORMAL
LDLC SERPL CALC-MCNC: 92 MG/DL (ref 0–99)
LIPASE SERPL-CCNC: 30 U/L (ref 14–72)
MCH RBC QN AUTO: 28.9 PG (ref 26.6–33)
MCHC RBC AUTO-ENTMCNC: 32.9 G/DL (ref 31.5–35.7)
MCV RBC AUTO: 88 FL (ref 79–97)
NRBC BLD AUTO-RTO: NORMAL %
PLATELET # BLD AUTO: 292 X10E3/UL (ref 150–450)
POTASSIUM SERPL-SCNC: 4.1 MMOL/L (ref 3.5–5.2)
PROT SERPL-MCNC: 6.4 G/DL (ref 6–8.5)
RBC # BLD AUTO: 4.85 X10E6/UL (ref 3.77–5.28)
SODIUM SERPL-SCNC: 139 MMOL/L (ref 134–144)
TIBC SERPL-MCNC: 378 UG/DL (ref 250–450)
TRIGL SERPL-MCNC: 190 MG/DL (ref 0–149)
TSH SERPL DL<=0.005 MIU/L-ACNC: 2.86 UIU/ML (ref 0.45–4.5)
UIBC SERPL-MCNC: 319 UG/DL (ref 131–425)
VLDLC SERPL CALC-MCNC: 32 MG/DL (ref 5–40)
VZV IGG SER IA-ACNC: 1456 INDEX
WBC # BLD AUTO: 5.7 X10E3/UL (ref 3.4–10.8)

## 2024-04-18 ENCOUNTER — HOSPITAL ENCOUNTER (OUTPATIENT)
Dept: RADIOLOGY | Facility: MEDICAL CENTER | Age: 39
End: 2024-04-18
Attending: NURSE PRACTITIONER
Payer: COMMERCIAL

## 2024-04-18 DIAGNOSIS — R10.12 LEFT UPPER QUADRANT ABDOMINAL PAIN: ICD-10-CM

## 2024-04-18 PROCEDURE — 76700 US EXAM ABDOM COMPLETE: CPT

## 2024-04-19 ENCOUNTER — OFFICE VISIT (OUTPATIENT)
Dept: MEDICAL GROUP | Facility: PHYSICIAN GROUP | Age: 39
End: 2024-04-19
Payer: COMMERCIAL

## 2024-04-19 VITALS — BODY MASS INDEX: 37.54 KG/M2 | HEIGHT: 60 IN | WEIGHT: 191.2 LBS

## 2024-04-19 DIAGNOSIS — E66.9 OBESITY (BMI 30-39.9): ICD-10-CM

## 2024-04-19 PROCEDURE — 99402 PREV MED CNSL INDIV APPRX 30: CPT | Performed by: PHARMACIST

## 2024-04-19 ASSESSMENT — FIBROSIS 4 INDEX: FIB4 SCORE: 0.62

## 2024-04-19 NOTE — PROGRESS NOTES
"Patient Consult Note - Initial Visit    TIME IN: 8:13am  TIME OUT: 8:42am    Primary care physician: MARY ELLEN Peace    Reason for consult: Obestiy/Weight Management    HPI:  Mattie Garcia is a 38 y.o. old patient who comes in today for evaluation of above stated problem.    Current BMI (4/19/24):  37.3     Most Recent HbA1c: No results found for: \"HBA1C\"   Lab Results   Component Value Date/Time    CREATININE 0.42 (L) 04/05/2024 05:09 AM    CREATININE 0.53 01/22/2020 04:19 AM              Current Weight Loss Medication Regimen  None  Previously attempted medications  Phentermine 15mg QD    Current Exercise - Consistent gym routine.  Takes classes that are ~45 min cardio followed by a short resistance training time.  This has been decreased recently with busy home life.  Exercise Goal - Get back to previous routines, aim to get at least 150-180 min/week moderate intensity exercise.    Dietary -   Patient has excellent nutrition habits in place.  Does meal prep for her work days, is cognizant about portions, and is sure to not overeat.  On her days off, she is good about keeping same sort of meal structure in place.  Focuses on lean proteins, veggies, fruits, and high fiber foods on regular basis.  Well hydrated with water.    Discussed warnings about reduced OCP efficacy in concomitant use of Zepboudn.  Pt to use additional contraceptive practices with Zepbound.     Past Medical History:  Patient Active Problem List    Diagnosis Date Noted    Vitamin D deficiency 04/27/2023    Premenstrual syndrome 04/27/2023    Prolonged menstrual cycle 02/02/2023    Breast pain, left 04/24/2022    Obesity (BMI 30-39.9) 12/11/2020    Abnormal LFTs 01/19/2020       Past Surgical History:  Past Surgical History:   Procedure Laterality Date    TRACY BY LAPAROSCOPY N/A 1/22/2020    Procedure: CHOLECYSTECTOMY, LAPAROSCOPIC;  Surgeon: Hien French M.D.;  Location: SURGERY [facility];  Service: " General    CT ERCP,DIAGNOSTIC N/A 1/21/2020    Procedure: ERCP (ENDOSCOPIC RETROGRADE CHOLANGIOPANCREATOGRAPHY) with sphincterotomy and balloon sweep and stent placement;  Surgeon: Julio Cesar King M.D.;  Location: SURGERY [facility];  Service: Gastroenterology    PTERYGIUM EXCISION  10/3/2014    Performed by Eldon Rangel M.D. at SURGERY SURG[facility]       Allergies:  Patient has no known allergies.    Social History:  Social History     Socioeconomic History    Marital status:      Spouse name: Not on file    Number of children: Not on file    Years of education: Not on file    Highest education level: Not on file   Occupational History    Not on file   Tobacco Use    Smoking status: Never    Smokeless tobacco: Never   Vaping Use    Vaping Use: Never used   Substance and Sexual Activity    Alcohol use: No    Drug use: No    Sexual activity: Yes     Partners: Male     Birth control/protection: None   Other Topics Concern    Not on file   Social History Narrative    Not on file     Social Determinants of Health     Financial Resource Strain: Not on file   Food Insecurity: Not on file   Transportation Needs: Not on file   Physical Activity: Not on file   Stress: Not on file   Social Connections: Not on file   Intimate Partner Violence: Not on file   Housing Stability: Not on file       Family History:  Family History   Problem Relation Age of Onset    Diabetes Father        Medications:    Current Outpatient Medications:     ergocalciferol (DRISDOL) 25424 UNIT capsule, Take 1 Capsule by mouth every 7 days., Disp: 12 Capsule, Rfl: 1    Labs: Reviewed    Physical Examination:  Vital signs: LMP 03/06/2024 (Approximate)  There is no height or weight on file to calculate BMI.    Assessment and Plan:    Patient seen today for initial visit referred by PCP.  Concerns with not being able to lose weight in spite of optimal nutrition and exercise habits.  She does work 3 on/4 off graveyard shifts, which  certain impacts sleep quality, which may be contributing to difficulty with weight loss.  Had success in the past using phentermine in conjunction with TLC measures, resulted in ~25lbs weight loss.    Discussed current available medications to assist with weight loss.  She is interested in finding if her insurance will cover GLP1a, if not, she is open to restarting phentermine.    - Medication changes -   START Wegovy 0.25mg SQ once weekly.  If not covered by insurance, patient willing to discuss restart of phentermine.     - Lifestyle changes -   Diet:  Continue excellent nutrition habits.  Exercise:  Get back to normal exercise routines, aim to get at least 150-180 min/week moderate intensity exercise.    Follow Up:  Four weeks.    Fabian Zarate, PharmDBCACP  04/19/24    CC:   MARY ELLEN Peace M.D.                                                   [facility] Pharmacotherapy Program Consent      Name    Mattie Garcia    MRN number: [id number]    the following are guidelines for participation in the [facility] Pharmacotherapy Program.     I, ____Mattie Garcia_____, understand and voluntarily agree to participate in the [facility] Pharmacotherapy Program and to have services provided to me by pharmacists working in collaboration with my provider.    I understand the pharmacist within the [facility] Pharmacotherapy Program may initiate, modify or discontinue my medications, order appropriate testing and appointments, perform exams, monitor treatment, and make clinical evaluations and decisions pursuant to a collaborative practice agreement with my provider.  I understand the pharmacist within the [facility] Pharmacotherapy Program is not a physician, osteopathic physician, advanced practice registered nurse or physician assistant and may not diagnose.  I will take all my medications as instructed and not change the way I take it without  first talking to my provider or a pharmacist within the [facility] Pharmacotherapy Program.  I understand that if I am late to my appointment I may not be able to be seen by a pharmacist at that time and will have to reschedule my appointment.  During appointment with pharmacist I understand that pharmacist has the right not to answer questions or perform services outside the pharmacist’s scope of practice.  By signing below, I provide informed consent for the pharmacist to provide these services and for my participation in the [facility] Pharmacotherapy Program.      Mattie Garcia           [id number]          04/19/24  Patient Name                   MRN number  Date     ___X_Obtained verbal consent from pt, No signature due to COVID concerns___   Patient Signature

## 2024-05-15 LAB
25(OH)D3+25(OH)D2 SERPL-MCNC: 14.8 NG/ML (ref 30–100)
ALBUMIN SERPL-MCNC: ABNORMAL G/DL (ref 3.9–4.9)
ALBUMIN/GLOB SERPL: ABNORMAL {RATIO} (ref 1.2–2.2)
ALP SERPL-CCNC: ABNORMAL IU/L (ref 44–121)
ALT SERPL-CCNC: ABNORMAL IU/L (ref 0–32)
AST SERPL-CCNC: ABNORMAL IU/L (ref 0–40)
BILIRUB SERPL-MCNC: ABNORMAL MG/DL (ref 0–1.2)
BUN SERPL-MCNC: ABNORMAL MG/DL (ref 6–20)
BUN/CREAT SERPL: ABNORMAL (ref 9–23)
CALCIUM SERPL-MCNC: ABNORMAL MG/DL (ref 8.7–10.2)
CHLORIDE SERPL-SCNC: 107 MMOL/L (ref 96–106)
CHOLEST SERPL-MCNC: NORMAL MG/DL (ref 100–199)
CO2 SERPL-SCNC: ABNORMAL MMOL/L (ref 20–29)
CREAT SERPL-MCNC: ABNORMAL MG/DL (ref 0.57–1)
EGFRCR SERPLBLD CKD-EPI 2021: ABNORMAL ML/MIN/1.73
ERYTHROCYTE [DISTWIDTH] IN BLOOD BY AUTOMATED COUNT: 12.2 % (ref 11.7–15.4)
FERRITIN SERPL-MCNC: 28 NG/ML (ref 15–150)
GLOBULIN SER CALC-MCNC: ABNORMAL G/DL (ref 1.5–4.5)
GLUCOSE SERPL-MCNC: ABNORMAL MG/DL (ref 70–99)
HCT VFR BLD AUTO: 42.5 % (ref 34–46.6)
HDLC SERPL-MCNC: 61 MG/DL
HGB BLD-MCNC: 14 G/DL (ref 11.1–15.9)
HIV 1+2 AB+HIV1 P24 AG SERPL QL IA: NON REACTIVE
IRON SATN MFR SERPL: NORMAL % (ref 15–55)
IRON SERPL-MCNC: NORMAL UG/DL (ref 27–159)
LABORATORY COMMENT REPORT: NORMAL
LDLC SERPL CALC-MCNC: NORMAL MG/DL (ref 0–99)
LIPASE SERPL-CCNC: 30 U/L (ref 14–72)
MCH RBC QN AUTO: 28.9 PG (ref 26.6–33)
MCHC RBC AUTO-ENTMCNC: 32.9 G/DL (ref 31.5–35.7)
MCV RBC AUTO: 88 FL (ref 79–97)
PLATELET # BLD AUTO: 292 X10E3/UL (ref 150–450)
POTASSIUM SERPL-SCNC: 4.1 MMOL/L (ref 3.5–5.2)
PROT SERPL-MCNC: ABNORMAL G/DL (ref 6–8.5)
RBC # BLD AUTO: 4.85 X10E6/UL (ref 3.77–5.28)
SODIUM SERPL-SCNC: 139 MMOL/L (ref 134–144)
TIBC SERPL-MCNC: NORMAL UG/DL (ref 250–450)
TRIGL SERPL-MCNC: NORMAL MG/DL (ref 0–149)
TSH SERPL DL<=0.005 MIU/L-ACNC: 2.86 UIU/ML (ref 0.45–4.5)
UIBC SERPL-MCNC: 319 UG/DL (ref 131–425)
VLDLC SERPL CALC-MCNC: NORMAL MG/DL (ref 5–40)
VZV IGG SER IA-ACNC: 1456 INDEX
WBC # BLD AUTO: 5.7 X10E3/UL (ref 3.4–10.8)

## 2025-04-17 ENCOUNTER — OFFICE VISIT (OUTPATIENT)
Dept: MEDICAL GROUP | Facility: MEDICAL CENTER | Age: 40
End: 2025-04-17
Payer: COMMERCIAL

## 2025-04-17 VITALS
SYSTOLIC BLOOD PRESSURE: 104 MMHG | HEART RATE: 85 BPM | DIASTOLIC BLOOD PRESSURE: 62 MMHG | RESPIRATION RATE: 16 BRPM | TEMPERATURE: 97.6 F | OXYGEN SATURATION: 97 % | BODY MASS INDEX: 36.38 KG/M2 | HEIGHT: 61 IN | WEIGHT: 192.68 LBS

## 2025-04-17 DIAGNOSIS — J30.2 SEASONAL ALLERGIES: ICD-10-CM

## 2025-04-17 DIAGNOSIS — Z71.3 WEIGHT LOSS COUNSELING, ENCOUNTER FOR: ICD-10-CM

## 2025-04-17 DIAGNOSIS — E66.9 OBESITY (BMI 30-39.9): ICD-10-CM

## 2025-04-17 DIAGNOSIS — R92.30 DENSE BREAST: ICD-10-CM

## 2025-04-17 PROCEDURE — 99214 OFFICE O/P EST MOD 30 MIN: CPT | Performed by: NURSE PRACTITIONER

## 2025-04-17 PROCEDURE — 3074F SYST BP LT 130 MM HG: CPT | Performed by: NURSE PRACTITIONER

## 2025-04-17 PROCEDURE — 3078F DIAST BP <80 MM HG: CPT | Performed by: NURSE PRACTITIONER

## 2025-04-17 RX ORDER — PHENTERMINE HYDROCHLORIDE 37.5 MG/1
37.5 CAPSULE ORAL EVERY MORNING
Qty: 30 CAPSULE | Refills: 2 | Status: SHIPPED | OUTPATIENT
Start: 2025-04-17 | End: 2025-07-16

## 2025-04-17 ASSESSMENT — PATIENT HEALTH QUESTIONNAIRE - PHQ9: CLINICAL INTERPRETATION OF PHQ2 SCORE: 0

## 2025-04-17 NOTE — PROGRESS NOTES
Subjective:     Mattie Garcia is a 39 y.o. female presents to discuss:     Chief Complaint   Patient presents with    Seasonal Allergies     Verbal consent was acquired by the patient to use K[employer] ambient listening note generation during this visit Yes   History of Present Illness  The patient presents for evaluation of allergies, weight management, and dense breast tissue.    She has been experiencing intermittent allergy symptoms, including nasal congestion, itching, and sneezing, for the past 2 years. These symptoms are particularly pronounced during seasonal changes. Occasional ear itching is also reported. Over-the-counter medications and herbal supplements are essential for her comfort. Despite residing in her current location for over 20 years, allergies have only manifested in the past 2 years. Nasal sprays were attempted but discontinued due to nosebleeds. 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 A humidifier is used in her room at night and during the day, with water and salt added. Wheezing is not experienced.    Phentermine was previously used for weight loss without adverse effects such as heart palpitations. She is interested in resuming this medication.    An upcoming appointment with her OB/GYN is scheduled at the end of this month. Interest in undergoing a procedure to prevent future pregnancies is expressed.   [facility]   A family history of breast cancer is noted, with her first cousin's mother and sister diagnosed with the disease. Her cousin passed away from breast cancer due to late diagnosis. Consideration is being given to a tummy tuck procedure due to discomfort from excess abdominal skin, which causes sweating and skin cracking. Consultation with a plastic surgeon has not yet occurred.      ROS: : see above      Current Outpatient  "Medications:     phentermine 37.5 MG capsule, Take 1 Capsule by mouth every morning for 90 days., Disp: 30 Capsule, Rfl: 2    Semaglutide (WEGOVY) 0.25 MG/0.5ML Solution Auto-injector Pen-injector, Inject 0.5 mL under the skin every 7 days., Disp: 3 mL, Rfl: 0    ergocalciferol (DRISDOL) 27437 UNIT capsule, Take 1 Capsule by mouth every 7 days., Disp: 12 Capsule, Rfl: 1    No Known Allergies    Objective:   Vitals: /62   Pulse 85   Temp 36.4 °C (97.6 °F) (Temporal)   Resp 16   Ht 1.549 m (5' 1\")   Wt 87.4 kg (192 lb 10.9 oz)   LMP 04/13/2025   SpO2 97%   BMI 36.41 kg/m²    General: Alert, pleasant, NAD  HEENT: Normocephalic.  Neck supple.   Respiratory: no distress, no audible wheezing, RR -WNL  Skin: Warm, dry, no rashes.  Extremities: No leg edema. No discoloration  Neurological: No tremors  Psych:  Affect/mood is normal, judgement is good, memory is intact, grooming is appropriate.  Assessment/Plan:      1. Seasonal allergies    2. Weight loss counseling, encounter for  - phentermine 37.5 MG capsule; Take 1 Capsule by mouth every morning for 90 days.  Dispense: 30 Capsule; Refill: 2    3. Obesity (BMI 30-39.9)  - phentermine 37.5 MG capsule; Take 1 Capsule by mouth every morning for 90 days.  Dispense: 30 Capsule; Refill: 2    4. Dense breast  - US-SCREENING WHOLE BREAST BILATERAL (3D SCREENING); Future       Assessment & Plan  Allergic rhinitis.  Symptoms include stuffy nose, itching, sneezing, and occasional ear itching, which worsen with seasonal changes.  Treatment plan: The patient has been using over-the-counter medications and herbal supplements. Nasacort nasal spray was recommended, to be used once daily for several weeks. Advised to continue using a humidifier and nasal saline rinses to alleviate dryness and prevent nosebleeds. If symptoms persist, a prescription for an antihistamine nasal spray will be considered.    Weight management: The patient requested to restart phentermine for " weight loss.  Treatment plan: A prescription for phentermine 37.5 mg was provided, with instructions to monitor for any adverse effects such as heart palpitations. Advised to watch caffeine intake and to discontinue the medication if any severe side effects occur. The prescription was sent to [facility] for a total of 3 months.    Dense breast tissue: The patient has dense breast tissue, which increases the risk of breast cancer.  Diagnostic plan: An automated breast ultrasound (ABUS) was ordered as an additional screening tool.  Treatment plan: Advised to continue annual mammograms and to consult with her insurance provider regarding coverage for the ABUS.    Follow-up: The patient will follow up in June or July.    Return for keep f/u in July .    Madeline HYDE    Health maintenance:    General Recommendations:   Smoking: recommend complete avoidance of all tobacco products  Alcohol: recommend limiting consumption  Physical Activity: goal is 30 min of moderate activity 5 times a week  Weight Management and Nutrition: high vegetable, high protein diet like the Mediterranean diet, portion control, avoid excessive sugars, Low Glycemic Index foods, adequate hydration, sleep.

## 2025-04-17 NOTE — LETTER
[facility]  ROSANNA Peace.  [address] [facility] [address] [place] [id number]  Fax: [phone]   Authorization for Release/Disclosure of   Protected Health Information   Name: JEREMÍAS NUNEZ : 1985 SSN: [id number]   Address: [address] [place] [address] Phone:    [phone] (home) [phone] (work)   I authorize the entity listed below to release/disclose the PHI below to:   [facility]/MARY ELLEN Peace and MARY ELLEN Peace   Provider or Entity Name:  [facility]    Address   City, State, Zip   Phone:      Fax:     Reason for request: continuity of care   Information to be released:    [  ] LAST COLONOSCOPY,  including any PATH REPORT and follow-up   [  ] LAST DEXA  [  ] LAST MAMMOGRAM  [ x ] LAST PAP  [  ] LAST LABS [  ] RETINA EXAM REPORT  [  ] IMMUNIZATION RECORDS  [  ] Release all info        _____ Care and treatment for drug and / or alcohol abuse  _____ HIV testing, infection status, or AIDS  _____ Genetic Testing    DATES OF SERVICE OR TIME PERIOD TO BE DISCLOSED: _____________  I understand and acknowledge that:  * This Authorization may be revoked at any time by you in writing, except if your health information has already been used or disclosed.  * Your health information that will be used or disclosed as a result of you signing this authorization could be re-disclosed by the recipient. If this occurs, your re-disclosed health information may no longer be protected by State or Federal laws.  * You may refuse to sign this Authorization. Your refusal will not affect your ability to obtain treatment.  * This Authorization becomes effective upon signing and will  on (date) __________.      If no date is indicated, this Authorization will  one (1) year from the signature date.    Name: Jeremías Nunez  Signature: Date:   2025     PLEASE FAX REQUESTED RECORDS BACK TO: [phone]

## 2025-06-18 NOTE — ED NOTES
Called report to [facility]. Pt is aware of POC. Pt belongings are on bed. Pt transported to floor by transport tech.     Follow-up in 1 year. We kindly ask that your arrive 15 minutes before your scheduled appointment time with your provider to allow our staff to room you, get your vital signs and update your chart.    Get lab work done . Please call the office if you need a script.  It is recommended to check with your insurance BEFORE getting labs done to make sure they are covered by your policy.      Call our office if you have any problems with abdominal pain especially associated with fever, chills, nausea, vomiting or any other concerns.    All  Post-bariatric surgery patients should be aware that very small quantities of any alcohol can cause impairment and it is very possible not to feel the effect. The effect can be in the system for several hours.  It is also a stomach irritant.     It is advised to AVOID alcohol, Nonsteroidal antiinflammatory drugs (NSAIDS) and nicotine of all forms . Any of these can cause stomach irritation/pain.    Discussed the effects of alcohol on a bariatric patient and the increased impairment risk.     Keep up the good work! Follow-up in 1 year. We kindly ask that your arrive 15 minutes before your scheduled appointment time with your provider to allow our staff to room you, get your vital signs and update your chart.    Get lab work done . Please call the office if you need a script.  It is recommended to check with your insurance BEFORE getting labs done to make sure they are covered by your policy.      Call our office if you have any problems with abdominal pain especially associated with fever, chills, nausea, vomiting or any other concerns.    All  Post-bariatric surgery patients should be aware that very small quantities of any alcohol can cause impairment and it is very possible not to feel the effect. The effect can be in the system for several hours.  It is also a stomach irritant.     It is advised to AVOID alcohol, Nonsteroidal antiinflammatory drugs (NSAIDS) and nicotine of all forms  . Any of these can cause stomach irritation/pain.    Discussed the effects of alcohol on a bariatric patient and the increased impairment risk.     Keep up the good work!

## 2025-06-19 ENCOUNTER — HOSPITAL ENCOUNTER (OUTPATIENT)
Dept: RADIOLOGY | Facility: MEDICAL CENTER | Age: 40
End: 2025-06-19
Payer: COMMERCIAL

## 2025-07-03 ENCOUNTER — APPOINTMENT (OUTPATIENT)
Dept: RADIOLOGY | Facility: MEDICAL CENTER | Age: 40
End: 2025-07-03
Attending: NURSE PRACTITIONER
Payer: COMMERCIAL

## 2025-07-17 ENCOUNTER — APPOINTMENT (OUTPATIENT)
Dept: MEDICAL GROUP | Facility: MEDICAL CENTER | Age: 40
End: 2025-07-17
Payer: COMMERCIAL

## 2025-07-17 VITALS
BODY MASS INDEX: 34.88 KG/M2 | OXYGEN SATURATION: 97 % | TEMPERATURE: 97.5 F | SYSTOLIC BLOOD PRESSURE: 110 MMHG | WEIGHT: 184.75 LBS | DIASTOLIC BLOOD PRESSURE: 70 MMHG | HEART RATE: 73 BPM | HEIGHT: 61 IN

## 2025-07-17 DIAGNOSIS — E78.5 DYSLIPIDEMIA: ICD-10-CM

## 2025-07-17 DIAGNOSIS — E66.9 OBESITY (BMI 30-39.9): ICD-10-CM

## 2025-07-17 DIAGNOSIS — E55.9 VITAMIN D DEFICIENCY: ICD-10-CM

## 2025-07-17 DIAGNOSIS — Z71.3 WEIGHT LOSS COUNSELING, ENCOUNTER FOR: ICD-10-CM

## 2025-07-17 DIAGNOSIS — Z00.00 ROUTINE GENERAL MEDICAL EXAMINATION AT A HEALTH CARE FACILITY: Primary | ICD-10-CM

## 2025-07-17 DIAGNOSIS — Z86.39 HISTORY OF IRON DEFICIENCY: ICD-10-CM

## 2025-07-17 PROCEDURE — 3078F DIAST BP <80 MM HG: CPT | Performed by: NURSE PRACTITIONER

## 2025-07-17 PROCEDURE — 3074F SYST BP LT 130 MM HG: CPT | Performed by: NURSE PRACTITIONER

## 2025-07-17 PROCEDURE — 99396 PREV VISIT EST AGE 40-64: CPT | Performed by: NURSE PRACTITIONER

## 2025-07-17 RX ORDER — PHENTERMINE HYDROCHLORIDE 37.5 MG/1
37.5 CAPSULE ORAL EVERY MORNING
Qty: 30 CAPSULE | Refills: 2 | Status: SHIPPED | OUTPATIENT
Start: 2025-07-17 | End: 2025-10-15

## 2025-07-17 SDOH — HEALTH STABILITY: PHYSICAL HEALTH: ON AVERAGE, HOW MANY DAYS PER WEEK DO YOU ENGAGE IN MODERATE TO STRENUOUS EXERCISE (LIKE A BRISK WALK)?: 5 DAYS

## 2025-07-17 SDOH — HEALTH STABILITY: PHYSICAL HEALTH: ON AVERAGE, HOW MANY MINUTES DO YOU ENGAGE IN EXERCISE AT THIS LEVEL?: 60 MIN

## 2025-07-17 SDOH — ECONOMIC STABILITY: TRANSPORTATION INSECURITY
IN THE PAST 12 MONTHS, HAS THE LACK OF TRANSPORTATION KEPT YOU FROM MEDICAL APPOINTMENTS OR FROM GETTING MEDICATIONS?: NO

## 2025-07-17 SDOH — ECONOMIC STABILITY: INCOME INSECURITY: IN THE LAST 12 MONTHS, WAS THERE A TIME WHEN YOU WERE NOT ABLE TO PAY THE MORTGAGE OR RENT ON TIME?: NO

## 2025-07-17 SDOH — ECONOMIC STABILITY: FOOD INSECURITY: WITHIN THE PAST 12 MONTHS, YOU WORRIED THAT YOUR FOOD WOULD RUN OUT BEFORE YOU GOT MONEY TO BUY MORE.: NEVER TRUE

## 2025-07-17 SDOH — ECONOMIC STABILITY: TRANSPORTATION INSECURITY
IN THE PAST 12 MONTHS, HAS LACK OF TRANSPORTATION KEPT YOU FROM MEETINGS, WORK, OR FROM GETTING THINGS NEEDED FOR DAILY LIVING?: NO

## 2025-07-17 SDOH — ECONOMIC STABILITY: INCOME INSECURITY: HOW HARD IS IT FOR YOU TO PAY FOR THE VERY BASICS LIKE FOOD, HOUSING, MEDICAL CARE, AND HEATING?: NOT VERY HARD

## 2025-07-17 SDOH — ECONOMIC STABILITY: FOOD INSECURITY: WITHIN THE PAST 12 MONTHS, THE FOOD YOU BOUGHT JUST DIDN'T LAST AND YOU DIDN'T HAVE MONEY TO GET MORE.: NEVER TRUE

## 2025-07-17 SDOH — ECONOMIC STABILITY: TRANSPORTATION INSECURITY
IN THE PAST 12 MONTHS, HAS LACK OF RELIABLE TRANSPORTATION KEPT YOU FROM MEDICAL APPOINTMENTS, MEETINGS, WORK OR FROM GETTING THINGS NEEDED FOR DAILY LIVING?: NO

## 2025-07-17 SDOH — ECONOMIC STABILITY: HOUSING INSECURITY
IN THE LAST 12 MONTHS, WAS THERE A TIME WHEN YOU DID NOT HAVE A STEADY PLACE TO SLEEP OR SLEPT IN A SHELTER (INCLUDING NOW)?: NO

## 2025-07-17 SDOH — HEALTH STABILITY: MENTAL HEALTH
STRESS IS WHEN SOMEONE FEELS TENSE, NERVOUS, ANXIOUS, OR CAN'T SLEEP AT NIGHT BECAUSE THEIR MIND IS TROUBLED. HOW STRESSED ARE YOU?: ONLY A LITTLE

## 2025-07-17 ASSESSMENT — SOCIAL DETERMINANTS OF HEALTH (SDOH)
HOW OFTEN DO YOU ATTEND CHURCH OR RELIGIOUS SERVICES?: NEVER
HOW OFTEN DO YOU GET TOGETHER WITH FRIENDS OR RELATIVES?: ONCE A WEEK
DO YOU BELONG TO ANY CLUBS OR ORGANIZATIONS SUCH AS CHURCH GROUPS UNIONS, FRATERNAL OR ATHLETIC GROUPS, OR SCHOOL GROUPS?: PATIENT DECLINED
DO YOU BELONG TO ANY CLUBS OR ORGANIZATIONS SUCH AS CHURCH GROUPS UNIONS, FRATERNAL OR ATHLETIC GROUPS, OR SCHOOL GROUPS?: PATIENT DECLINED
HOW OFTEN DO YOU ATTEND CHURCH OR RELIGIOUS SERVICES?: NEVER
HOW OFTEN DO YOU HAVE SIX OR MORE DRINKS ON ONE OCCASION: NEVER
HOW HARD IS IT FOR YOU TO PAY FOR THE VERY BASICS LIKE FOOD, HOUSING, MEDICAL CARE, AND HEATING?: NOT VERY HARD
HOW OFTEN DO YOU ATTENT MEETINGS OF THE CLUB OR ORGANIZATION YOU BELONG TO?: PATIENT DECLINED
HOW OFTEN DO YOU HAVE A DRINK CONTAINING ALCOHOL: MONTHLY OR LESS
IN THE PAST 12 MONTHS, HAS THE ELECTRIC, GAS, OIL, OR WATER COMPANY THREATENED TO SHUT OFF SERVICE IN YOUR HOME?: NO
WITHIN THE PAST 12 MONTHS, YOU WORRIED THAT YOUR FOOD WOULD RUN OUT BEFORE YOU GOT THE MONEY TO BUY MORE: NEVER TRUE
HOW OFTEN DO YOU ATTENT MEETINGS OF THE CLUB OR ORGANIZATION YOU BELONG TO?: PATIENT DECLINED
HOW MANY DRINKS CONTAINING ALCOHOL DO YOU HAVE ON A TYPICAL DAY WHEN YOU ARE DRINKING: 1 OR 2
IN A TYPICAL WEEK, HOW MANY TIMES DO YOU TALK ON THE PHONE WITH FAMILY, FRIENDS, OR NEIGHBORS?: ONCE A WEEK
IN A TYPICAL WEEK, HOW MANY TIMES DO YOU TALK ON THE PHONE WITH FAMILY, FRIENDS, OR NEIGHBORS?: ONCE A WEEK
HOW OFTEN DO YOU GET TOGETHER WITH FRIENDS OR RELATIVES?: ONCE A WEEK

## 2025-07-17 ASSESSMENT — LIFESTYLE VARIABLES
HOW OFTEN DO YOU HAVE A DRINK CONTAINING ALCOHOL: MONTHLY OR LESS
HOW MANY STANDARD DRINKS CONTAINING ALCOHOL DO YOU HAVE ON A TYPICAL DAY: 1 OR 2
SKIP TO QUESTIONS 9-10: 1
AUDIT-C TOTAL SCORE: 1
HOW OFTEN DO YOU HAVE SIX OR MORE DRINKS ON ONE OCCASION: NEVER

## 2025-07-17 NOTE — PROGRESS NOTES
CC:   Chief Complaint   Patient presents with    Annual Exam       HPI:   Mattie Garcia is a 40 y.o. female who presents for Routine Preventative Exam    Verbal consent was acquired by the patient to use [employer] listening note generation during this visit Yes   History of Present Illness  The patient presents for an annual exam.    She has been performing self-breast exams. She has not received the influenza vaccine since  and has been falling ill more frequently than usual over the past two years.  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    She has been focusing on muscle strengthening exercises for the past 4 to 5 months and is making efforts to maintain a healthy lifestyle. She has been lifting weights and going to the gym regularly. She has been trying to eat healthy.    Diet: She has been trying to eat healthy.    Last Pap Smear: 2025  H/O Abnormal Pap: No  Last Mammogram: 3/29/2025  Last Bone Density Test: n/a  Last Colorectal Cancer Screening: n/a  Last Tdap: 2017    Up to date on Eye Exam:  Yes  Up to date on Dental Cleanings:  Yes    Flu vaccine: not in season  Hep C screening: done  HIV screening: done  Self breast exams:Yes  Safe Relationship: yes  Exercise: Yes    No LMP recorded.    Birth control: surgical   No significant bloating/fluid retention, pelvic pain, or dyspareunia. No abnormal vaginal discharge.  No breast tenderness, mass, nipple discharge, changes in size or contour, or abnormal cyclic discomfort.    OB History    Para Term  AB Living   4 0 0 0 0 4   SAB IAB Ectopic Molar Multiple Live Births   0 0 0 0 0 0      She  reports being sexually active and has had partner(s) who are male. She reports using the following method of birth control/protection: None.    She  has a past medical history of Migraines.  She  has a past surgical history that includes pterygium excision (10/3/2014); pr ercp,diagnostic (N/A, 2020);  "and gregory by laparoscopy (N/A, 1/22/2020).    Family History   Problem Relation Age of Onset    Diabetes Father     Breast Cancer Other         x 2 Maternal Cousin Breast Cancer     Social History[1]    Patient Active Problem List    Diagnosis Date Noted    Vitamin D deficiency 04/27/2023    Premenstrual syndrome 04/27/2023    Prolonged menstrual cycle 02/02/2023    Breast pain, left 04/24/2022    Obesity (BMI 30-39.9) 12/11/2020    Abnormal LFTs 01/19/2020     Current Medications[2]  Allergies[3]    Review of Systems   Constitutional: Negative for fever, chills and malaise/fatigue.   HENT: Negative for congestion.    Eyes: Negative for pain.   Respiratory: Negative for cough and shortness of breath.    Cardiovascular: Negative for chest pain and leg swelling.   Gastrointestinal: Negative for nausea, vomiting, abdominal pain and diarrhea.   Genitourinary: Negative for dysuria and hematuria.   Skin: Negative for rash.   Neurological: Negative for dizziness, focal weakness and headaches.   Endo/Heme/Allergies: Does not bruise/bleed easily.   Psychiatric/Behavioral: Negative for depression.  The patient is not nervous/anxious.      Objective:   /70 (BP Location: Right arm, Patient Position: Sitting, BP Cuff Size: Adult)   Pulse 73   Temp 36.4 °C (97.5 °F) (Temporal)   Ht 1.549 m (5' 1\")   Wt 83.8 kg (184 lb 11.9 oz)   SpO2 97%   BMI 34.91 kg/m²   Wt Readings from Last 4 Encounters:   07/17/25 83.8 kg (184 lb 11.9 oz)   04/17/25 87.4 kg (192 lb 10.9 oz)   04/19/24 86.7 kg (191 lb 3.2 oz)   03/21/24 82.6 kg (182 lb)   Physical Exam:  Constitutional: Well-developed and well-nourished. Not diaphoretic. No distress.   Skin: Skin is warm and dry. No rash noted.  Head: Atraumatic without lesions.  Eyes: Conjunctivae and extraocular motions are normal. Pupils are equal, round, and reactive to light. No scleral icterus.   Ears:  External ears unremarkable.  Nose: Nares patent.   Neck: Supple, trachea midline. Normal " range of motion. No thyromegaly present. No lymphadenopathy--cervical or supraclavicular.  Cardiovascular: Regular rate and rhythm, S1 and S2 without murmur, rubs, or gallops.    Respiratory: Effort normal. Clear to auscultation throughout. No adventitious sounds.   Abdomen: Soft, non tender, and without distention.   Extremities: No cyanosis, clubbing, erythema, nor edema. Distal pulses intact and symmetric.   Musculoskeletal: All major joints AROM full in all directions without pain.  Neurological: Alert and oriented x 3. Grossly non-focal.   Psychiatric:  Behavior, mood, and affect are appropriate.    Assessment and Plan:     1. Routine general medical examination at a health care facility  Relatively healthy adult 40-year-old female. Up to date on recommended screenings.   Continue to recommend avoiding illicit drugs, tobacco products, limit EtOH use.  Recommend heart healthy diet, exercise least 3-5 times weekly.  Follow-up annually for routine preventative exam.    2. Obesity (BMI 30-39.9)  - phentermine 37.5 MG capsule; Take 1 Capsule by mouth every morning for 90 days.  Dispense: 30 Capsule; Refill: 2  - TSH; Future    3. Weight loss counseling, encounter for  - phentermine 37.5 MG capsule; Take 1 Capsule by mouth every morning for 90 days.  Dispense: 30 Capsule; Refill: 2    4. Vitamin D deficiency  - VITAMIN D,25 HYDROXY (DEFICIENCY); Future    5. Dyslipidemia  - TSH; Future  - Comp Metabolic Panel; Future  - Lipid Profile; Future    6. History of iron deficiency  - CBC WITHOUT DIFFERENTIAL; Future  - IRON/TOTAL IRON BIND; Future  - FERRITIN; Future    Assessment & Plan  1. Weight management  -phentermine Rx, combined with healthy eating, exercise has resulted in a weight loss.  -  Weight has fluctuated, currently at 184 lbs, previously 190 lbs  - she will resume Phentermine 37.5 mg provided.  checked.    2. Health maintenance  - Advised to receive influenza vaccine this fall  - Comprehensive fasting  labs ordered, including iron studies, vitamin D, cholesterol, fasting sugar, electrolytes, liver function tests, kidney function tests, thyroid function tests, and complete blood count  - Encouraged to continue exercise regimen and consider incorporating Pilates into routine  - Discussed importance of maintaining muscle tone for overall health and independence      Health maintenance Reviewed:  -Labs per orders-  -Immunizations per orders-  -Patient counseled about skin care, diet, supplements, and exercise, self care.   -Discussed  breast self exam, mammography screening, adequate intake of calcium and vitamin D, diet and exercise, colorectal cancer screening       Anticipatory Guidance:   Smoking: recommend complete avoidance of all tobacco products  Alcohol: recommend limiting consumption  Physical Activity: goal is 30 min of moderate activity 5 times a week  Sleep: National Sleep Foundation and other sleep experts recommend that adults get 7 to 9 hours of sleep per night.   Why it's important: Getting enough sleep is crucial for physical and mental health, and insufficient sleep can lead to various health problems.   Adverse Health Outcomes: Insufficient sleep has been linked to weight gain, obesity, diabetes, hypertension, heart disease, stroke, depression, and increased risk of death.   Weight Management and Nutrition: high vegetable, high protein diet like the Mediterranean diet, portion control, avoid excessive sugars, adequate water intake.   Consider plant based whole grain options    Follow-up: Return for Annual Preventative Exam.    Madeline GONSALEZ          [1]   Social History  Tobacco Use    Smoking status: Never    Smokeless tobacco: Never   Vaping Use    Vaping status: Never Used   Substance Use Topics    Alcohol use: No    Drug use: No   [2]   Current Outpatient Medications   Medication Sig Dispense Refill    phentermine 37.5 MG capsule Take 1 Capsule by mouth every morning for 90 days. 30  Capsule 2    Semaglutide (WEGOVY) 0.25 MG/0.5ML Solution Auto-injector Pen-injector Inject 0.5 mL under the skin every 7 days. 3 mL 0    ergocalciferol (DRISDOL) 40261 UNIT capsule Take 1 Capsule by mouth every 7 days. 12 Capsule 1     No current facility-administered medications for this visit.   [3] No Known Allergies

## 2025-07-21 LAB
25(OH)D3+25(OH)D2 SERPL-MCNC: 19.2 NG/ML (ref 30–100)
ALBUMIN SERPL-MCNC: 4.1 G/DL (ref 3.9–4.9)
ALP SERPL-CCNC: 89 IU/L (ref 44–121)
ALT SERPL-CCNC: 34 IU/L (ref 0–32)
AST SERPL-CCNC: 25 IU/L (ref 0–40)
BILIRUB SERPL-MCNC: 0.4 MG/DL (ref 0–1.2)
BUN SERPL-MCNC: 9 MG/DL (ref 6–24)
BUN/CREAT SERPL: 15 (ref 9–23)
CALCIUM SERPL-MCNC: 9 MG/DL (ref 8.7–10.2)
CHLORIDE SERPL-SCNC: 105 MMOL/L (ref 96–106)
CHOLEST SERPL-MCNC: 210 MG/DL (ref 100–199)
CO2 SERPL-SCNC: 20 MMOL/L (ref 20–29)
CREAT SERPL-MCNC: 0.6 MG/DL (ref 0.57–1)
EGFRCR SERPLBLD CKD-EPI 2021: 116 ML/MIN/1.73
ERYTHROCYTE [DISTWIDTH] IN BLOOD BY AUTOMATED COUNT: 12.3 % (ref 11.7–15.4)
FERRITIN SERPL-MCNC: 37 NG/ML (ref 15–150)
GLOBULIN SER CALC-MCNC: 2.7 G/DL (ref 1.5–4.5)
GLUCOSE SERPL-MCNC: 79 MG/DL (ref 70–99)
HCT VFR BLD AUTO: 44.2 % (ref 34–46.6)
HDLC SERPL-MCNC: 45 MG/DL
HGB BLD-MCNC: 14.5 G/DL (ref 11.1–15.9)
IRON SATN MFR SERPL: 29 % (ref 15–55)
IRON SERPL-MCNC: 117 UG/DL (ref 27–159)
LDL CALC COMMENT:: ABNORMAL
LDLC SERPL CALC-MCNC: 135 MG/DL (ref 0–99)
MCH RBC QN AUTO: 29.1 PG (ref 26.6–33)
MCHC RBC AUTO-ENTMCNC: 32.8 G/DL (ref 31.5–35.7)
MCV RBC AUTO: 89 FL (ref 79–97)
NRBC BLD AUTO-RTO: NORMAL %
PLATELET # BLD AUTO: 341 X10E3/UL (ref 150–450)
POTASSIUM SERPL-SCNC: 4 MMOL/L (ref 3.5–5.2)
PROT SERPL-MCNC: 6.8 G/DL (ref 6–8.5)
RBC # BLD AUTO: 4.98 X10E6/UL (ref 3.77–5.28)
SODIUM SERPL-SCNC: 139 MMOL/L (ref 134–144)
TIBC SERPL-MCNC: 397 UG/DL (ref 250–450)
TRIGL SERPL-MCNC: 166 MG/DL (ref 0–149)
TSH SERPL DL<=0.005 MIU/L-ACNC: 3.42 UIU/ML (ref 0.45–4.5)
UIBC SERPL-MCNC: 280 UG/DL (ref 131–425)
VLDLC SERPL CALC-MCNC: 30 MG/DL (ref 5–40)
WBC # BLD AUTO: 8.6 X10E3/UL (ref 3.4–10.8)

## (undated) DEVICE — NEEDLE INSFL 120MM 14GA VRRS - (20/BX)

## (undated) DEVICE — SODIUM CHL IRRIGATION 0.9% 1000ML (12EA/CA)

## (undated) DEVICE — DRESSING TRANSPARENT FILM TEGADERM 2.375 X 2.75"  (100EA/BX)"

## (undated) DEVICE — SUTURE GENERAL

## (undated) DEVICE — PACK LAP CHOLE OR - (2EA/CA)

## (undated) DEVICE — KIT ANESTHESIA W/CIRCUIT & 3/LT BAG W/FILTER (20EA/CA)

## (undated) DEVICE — SENSOR SPO2 NEO LNCS ADHESIVE (20/BX) SEE USER NOTES

## (undated) DEVICE — GOWN WARMING STANDARD FLEX - (30/CA)

## (undated) DEVICE — LACTATED RINGERS INJ 1000 ML - (14EA/CA 60CA/PF)

## (undated) DEVICE — BALLOON RETRIEVAL EXTRACTOR PRO RX   9-12MM

## (undated) DEVICE — CHLORAPREP 26 ML APPLICATOR - ORANGE TINT(25/CA)

## (undated) DEVICE — TUBE E-T HI-LO CUFF 7.0MM (10EA/PK)

## (undated) DEVICE — HEAD HOLDER JUNIOR/ADULT

## (undated) DEVICE — TROCAR5X55 KII SHIELDED SYS - (6/BX)

## (undated) DEVICE — ELECTRODE 850 FOAM ADHESIVE - HYDROGEL RADIOTRNSPRNT (50/PK)

## (undated) DEVICE — TROCARCANN&SEAL 5X55 ZTHREAD - 12/BX

## (undated) DEVICE — SUTURE 4-0 VICRYL PLUS FS-2 - 27 INCH (36/BX)

## (undated) DEVICE — SPONGE GAUZESTER. 2X2 4-PL - (2/PK 50PK/BX 30BX/CS)

## (undated) DEVICE — SET EXTENSION WITH 2 PORTS (48EA/CA) ***PART #2C8610 IS A SUBSTITUTE*****

## (undated) DEVICE — GLOVE BIOGEL SZ 6.5 SURGICAL PF LTX (50PR/BX 4BX/CA)

## (undated) DEVICE — ELECTRODE DUAL RETURN W/ CORD - (50/PK)

## (undated) DEVICE — MASK ANESTHESIA ADULT  - (100/CA)

## (undated) DEVICE — JAGTOME RX 39 PRELOADED .025 X 260CM

## (undated) DEVICE — CLOSURE WOUND 1/4 X 4 (STERI - STRIP) (50/BX 4BX/CA)

## (undated) DEVICE — BOVIE GRNDNG PAD FOR ARGON - 10/BX 4BX/CS

## (undated) DEVICE — GLOVE BIOGEL INDICATOR SZ 6.5 SURGICAL PF LTX - (50PR/BX 4BX/CA)

## (undated) DEVICE — KIT ROOM DECONTAMINATION

## (undated) DEVICE — FILM CASSETTE ENDO

## (undated) DEVICE — SET SUCTION/IRRIGATION WITH DISPOSABLE TIP (6/CA )PART #0250-070-520 IS A SUB

## (undated) DEVICE — CANISTER SUCTION 3000ML MECHANICAL FILTER AUTO SHUTOFF MEDI-VAC NONSTERILE LF DISP  (40EA/CA)

## (undated) DEVICE — PROTECTOR ULNA NERVE - (36PR/CA)

## (undated) DEVICE — STERI STRIP COMPOUND BENZOIN - TINCTURE 0.6ML WITH APPLICATOR (40EA/BX)

## (undated) DEVICE — SUCTION INSTRUMENT YANKAUER BULBOUS TIP W/O VENT (50EA/CA)

## (undated) DEVICE — SET LEADWIRE 5 LEAD BEDSIDE DISPOSABLE ECG (1SET OF 5/EA)

## (undated) DEVICE — DETERGENT RENUZYME PLUS 10 OZ PACKET (50/BX)

## (undated) DEVICE — KIT CUSTOM PROCEDURE SINGLE FOR ENDO  (15/CA)

## (undated) DEVICE — TUBE CONNECT SUCTION CLEAR 120 X 1/4" (50EA/CA)"

## (undated) DEVICE — TUBING CLEARLINK DUO-VENT - C-FLO (48EA/CA)

## (undated) DEVICE — NEPTUNE 4 PORT MANIFOLD - (20/PK)

## (undated) DEVICE — WIRE GUIDE LOCKING DEVICE (10/BX)

## (undated) DEVICE — Device

## (undated) DEVICE — BITE BLOCK ADULT 60FR (100EA/CA)

## (undated) DEVICE — APPLIER ENDO MEDIUM CLIP (3EA/BX)